# Patient Record
Sex: FEMALE | Race: WHITE | Employment: OTHER | URBAN - METROPOLITAN AREA
[De-identification: names, ages, dates, MRNs, and addresses within clinical notes are randomized per-mention and may not be internally consistent; named-entity substitution may affect disease eponyms.]

---

## 2017-10-20 ENCOUNTER — OFFICE VISIT (OUTPATIENT)
Dept: CARDIOLOGY CLINIC | Age: 67
End: 2017-10-20

## 2017-10-20 VITALS
WEIGHT: 168 LBS | HEART RATE: 76 BPM | HEIGHT: 63 IN | BODY MASS INDEX: 29.77 KG/M2 | SYSTOLIC BLOOD PRESSURE: 160 MMHG | OXYGEN SATURATION: 98 % | DIASTOLIC BLOOD PRESSURE: 100 MMHG

## 2017-10-20 DIAGNOSIS — I25.119 CORONARY ARTERY DISEASE INVOLVING NATIVE CORONARY ARTERY OF NATIVE HEART WITH ANGINA PECTORIS (HCC): Primary | ICD-10-CM

## 2017-10-20 PROBLEM — I25.10 CAD (CORONARY ARTERY DISEASE): Status: ACTIVE | Noted: 2017-10-20

## 2017-10-20 RX ORDER — TOLTERODINE 4 MG/1
4 CAPSULE, EXTENDED RELEASE ORAL DAILY
Status: ON HOLD | COMMUNITY
End: 2021-06-22 | Stop reason: CLARIF

## 2017-10-20 RX ORDER — PRAVASTATIN SODIUM 40 MG/1
40 TABLET ORAL
COMMUNITY

## 2017-10-20 RX ORDER — SUCRALFATE 1 G/1
1 TABLET ORAL 4 TIMES DAILY
Status: ON HOLD | COMMUNITY
End: 2021-06-22 | Stop reason: CLARIF

## 2017-10-20 RX ORDER — CARVEDILOL 6.25 MG/1
3.12 TABLET ORAL 2 TIMES DAILY WITH MEALS
COMMUNITY
End: 2022-03-14 | Stop reason: SDUPTHER

## 2017-10-20 RX ORDER — ASPIRIN 325 MG
325 TABLET ORAL DAILY
COMMUNITY

## 2017-10-20 RX ORDER — CHOLECALCIFEROL (VITAMIN D3) 125 MCG
CAPSULE ORAL DAILY
COMMUNITY

## 2017-10-20 RX ORDER — VITAMIN E 268 MG
CAPSULE ORAL DAILY
COMMUNITY

## 2017-10-20 RX ORDER — OMEPRAZOLE 40 MG/1
40 CAPSULE, DELAYED RELEASE ORAL DAILY
COMMUNITY

## 2017-10-20 NOTE — PROGRESS NOTES
Zach Schmitz MD    [id number] [place], [address] [facility]    Office [phone],AFB [phone]  Pager [phone]    Fadumo Falk is a 69 y.o. female referred to establish care with cardiology. Consult requested by Cristy Lord MD      Primary care physician:  Cristy Lord MD      Chief complaint:  Fadumo Falk is a 69 y.o. female who complains of No chief complaint on file. Dear Dr. Samaria Chan,    I had the pleasure of seeing Ms Carlos Garduno in the office today. Assessment:  CAD -nonobstructive disease by cardiac cath at [facility] 5/2017. Patient does not want to have another stress nuclear study - \"ever\". She has had it twice once in the 1990s and again in 2017 and had\" bad reaction which almost caused a heart attack\" both times. HTN- not controlled  Hx of CVA  Anxiety      Plan:     Patient states that she is allergic to multiple medications. She is allergic to Cozaar/amlodipine. With regard to the amlodipine it is more of intolerance because of swelling in the lower extremities. She is not sure what the cause of her allergy is. She understands that if her blood pressure runs high it can affect multiple organs. She is already had a CVA. She states that she will try taking 1-1/2 tablets of the Coreg 6.25 twice daily. Will get records from Dr. Summer Harmon office. Aggressive cardiovascular risk factor modification. Follow-up in 6 months or earlier as needed. 11/2016 - ; ; HDL 46; ;       Patient understands the plan. All questions were answered to the patient's satisfaction. Medication Side Effects and Warnings were discussed with patient: yes  Patient Labs were reviewed and or requested:  yes  Patient Past Records were reviewed and or requested: yes    I appreciate the opportunity to be involved in Ms. Schmidt. Please see note below for details. Please do not hesitate to contact us with questions or concerns.     Zach Schmitz MD    Cardiac Testing/ Procedures: A. Cardiac Cath/PCI: 5/23/17-N STEMI-CJ W; left main-normal, LAD-mid 30-40%, left [id number]%, RCA-medium vessel small PDA; nyyvbaha68%    B. ECHO/BRITTA:    C.StressNuclear/Stress ECHO/Stress test:    D.Vascular:    E. EP:    F. Miscellaneous:    History of present illness:  Patient is a 61-year-old  female (her  is a patient here too ) who is here to establish care with cardiology. Denies any chest pain, dyspnea. Occasional mild swelling lower extremities. No dizziness, syncope. She states that she is allergic to some blood pressure medications. She has had difficulty controlling her blood pressure. She also states that she is very anxious and because of the anxiety her blood pressure goes up. Past Medical History:   Diagnosis Date    Arthritis     Asthma     CAD (coronary artery disease)     MI 5/2017    Diverticula of colon     Gastrointestinal disorder     Hypertension     Neurological disorder     CVA     Stroke [facility])       Past Surgical History:   Procedure Laterality Date    HX APPENDECTOMY      HX CHOLECYSTECTOMY      HX GYN      hysterectomy    HX ORTHOPAEDIC      left knee replacement     Family History   Problem Relation Age of Onset    Stroke Mother     Coronary Artery Disease Father       Social History   Substance Use Topics    Smoking status: Former Smoker    Smokeless tobacco: Never Used    Alcohol use No          Medications before admission:    Current Outpatient Prescriptions   Medication Sig Dispense    carvedilol (COREG) 6.25 mg tablet Take  by mouth two (2) times daily (with meals).  tolterodine ER (DETROL LA) 4 mg ER capsule Take 4 mg by mouth daily.  omeprazole (PRILOSEC) 40 mg capsule Take 40 mg by mouth two (2) times a day.  pravastatin (PRAVACHOL) 40 mg tablet Take 40 mg by mouth nightly.  aspirin (ASPIRIN) 325 mg tablet Take 325 mg by mouth daily.      sucralfate (CARAFATE) 1 gram tablet Take 1 g by mouth four (4) times daily.  vitamin E (AQUA GEMS) 400 unit capsule Take  by mouth daily.  cholecalciferol, vitamin D3, (VITAMIN D3) 2,000 unit tab Take  by mouth. No current facility-administered medications for this visit. Review of Systems:  (bold if positive, if negative)    Gen:  Eyes:   Need glassesENT:  CVS:  Pulm:  GI:  Heart burn  :    MS:  Pain, arthritisSkin:  Psych:  anxietyEndo:    Hem:  Renal:    Neuro:headache        Physical Exam:  Visit Vitals    BP (!) 160/100 (BP 1 Location: Right arm, BP Patient Position: Sitting)    Pulse 76    Ht 5' 3\" (1.6 m)    Wt 168 lb (76.2 kg)    SpO2 98%    BMI 29.76 kg/m2          Gen: Well-developed, well-nourished, in no acute distress  HEENT:  Pink conjunctivae, hearing intact to voice, moist mucous membranes  Neck: Supple,No JVD, No Carotid Bruit, Thyroid- non tender  Resp: No accessory muscle use, Clear breath sounds, No rales or rhonchi  Card: Regular Rate,Rythm,Normal S1, S2, No murmurs, rubs or gallop. No thrills. Abd:  Soft, non-tender, non-distended, normoactive bowel sounds are present,   MSK: No cyanosis or clubbing  Skin: No rashes or ulcers  Neuro:  moving all four extremities, no focal deficit, follows commands appropriately  Psych:  Good insight, oriented to person, place and time, alert, Nml Affect  LE: No edema  Vascular: Radial Pulses 2+ and symmetric        EKG: SR/Nml axis/ Nml intervals      Cxray:    LABS:    No results for input(s): CPK, TROIQ in the last 72 hours.     No lab exists for component: CKQMB, CPKMB    Lab Results   Component Value Date/Time    WBC 5.3 11/11/2015 11:30 PM    HGB 12.7 11/11/2015 11:30 PM    HCT 38.7 11/11/2015 11:30 PM    PLATELET 695 75/19/6158 11:30 PM    MCV 94.2 11/11/2015 11:30 PM     Lab Results   Component Value Date/Time    Sodium 141 11/11/2015 11:30 PM    Potassium 3.9 11/11/2015 11:30 PM    Chloride 106 11/11/2015 11:30 PM    CO2 29 11/11/2015 11:30 PM    Anion gap 6 11/11/2015 11:30 PM    Glucose 104 11/11/2015 11:30 PM    BUN 16 11/11/2015 11:30 PM    Creatinine 0.87 11/11/2015 11:30 PM    BUN/Creatinine ratio 18 11/11/2015 11:30 PM    GFR est AA >60 11/11/2015 11:30 PM    GFR est non-AA >60 11/11/2015 11:30 PM    Calcium 8.8 11/11/2015 11:30 PM             Giovani Hitchcock MD

## 2017-10-20 NOTE — MR AVS SNAPSHOT
Visit Information Date & Time Provider Department Dept. Phone Encounter #  
 10/20/2017 11:00 AM Los Cardona MD [facility] [place] [place] [phone] [id number] Upcoming Health Maintenance Date Due Hepatitis C Screening 1950 DTaP/Tdap/Td series (1 - Tdap) 5/14/1969 BREAST CANCER SCRN MAMMOGRAM 5/14/1998 FOBT Q 1 YEAR AGE 50-75 5/14/1998 ZOSTER VACCINE AGE 60> 3/14/2008 GLAUCOMA SCREENING Q2Y 5/14/2013 OSTEOPOROSIS SCREENING (DEXA) 5/14/2013 Pneumococcal 65+ Low/Medium Risk (1 of 2 - PCV13) 5/14/2013 MEDICARE YEARLY EXAM 5/14/2013 INFLUENZA AGE 9 TO ADULT 8/1/2017 Allergies as of 10/20/2017  In Progress On: 11/11/2015 By: Khushboo Hand RN Severity Noted Reaction Type Reactions Latex Medium 11/11/2015    Hives Morphine High 11/11/2015    Anaphylaxis Pcn [Penicillins]  11/11/2015    Unknown (comments) Current Immunizations  Never Reviewed No immunizations on file. Not reviewed this visit You Were Diagnosed With   
  
 Codes Comments Coronary artery disease involving native coronary artery of native heart with angina pectoris ([facility] [address]    -  Primary ICD-10-CM: I25.119 ICD-9-CM: 414.01, 413.9 Vitals BP Pulse Height(growth percentile) Weight(growth percentile) SpO2 BMI  
 (!) 160/100 (BP 1 Location: Right arm, BP Patient Position: Sitting) 76 5' 3\" (1.6 m) 168 lb (76.2 kg) 98% 29.76 kg/m2 OB Status Smoking Status Postpartum Former Smoker Vitals History BMI and BSA Data Body Mass Index Body Surface Area  
 29.76 kg/m 2 1.84 m 2 Your Updated Medication List  
  
   
This list is accurate as of: 10/20/17 11:45 AM.  Always use your most recent med list.  
  
  
  
  
 aspirin 325 mg tablet Commonly known as:  ASPIRIN Take 325 mg by mouth daily. carvedilol 6.25 mg tablet Commonly known as:  Macel Da Take  by mouth two (2) times daily (with meals). omeprazole 40 mg capsule Commonly known as:  PRILOSEC Take 40 mg by mouth two (2) times a day. pravastatin 40 mg tablet Commonly known as:  PRAVACHOL Take 40 mg by mouth nightly. sucralfate 1 gram tablet Commonly known as:  Charm Chencho Take 1 g by mouth four (4) times daily. tolterodine ER 4 mg ER capsule Commonly known as:  Edwardo Nails Take 4 mg by mouth daily. VITAMIN D3 2,000 unit Tab Generic drug:  cholecalciferol (vitamin D3) Take  by mouth.  
  
 vitamin E 400 unit capsule Commonly known as:  [address] Take  by mouth daily. We Performed the Following AMB POC EKG ROUTINE W/ 12 LEADS, INTER & REP [70000 CPT(R)] Introducing \[facility]\"" & [facility] SERVICES! [facility] introduces [employer] patient portal. Now you can access parts of your medical record, email your doctor's office, and request medication refills online. 1. In your internet browser, go to [email or web address] 2. Click on the First Time User? Click Here link in the Sign In box. You will see the New Member Sign Up page. 3. Enter your [employer] Access Code exactly as it appears below. You will not need to use this code after youve completed the sign-up process. If you do not sign up before the expiration date, you must request a new code. · [employer] Access Code: [email or web address] Expires: 1/18/2018 11:10 AM 
 
4. Enter the last four digits of your Social Security Number (xxxx) and Date of Birth (mm/dd/yyyy) as indicated and click Submit. You will be taken to the next sign-up page. 5. Create a [employer] Wanted ID. This will be your [employer] login ID and cannot be changed, so think of one that is secure and easy to remember. 6. Create a [employer] password. You can change your password at any time. 7. Enter your Password Reset Question and Answer. This can be used at a later time if you forget your password. 8. Enter your e-mail address.  You will receive e-mail notification when new information is available in [employer]. 9. Click Sign Up. You can now view and download portions of your medical record. 10. Click the Download Summary menu link to download a portable copy of your medical information. If you have questions, please visit the Frequently Asked Questions section of the [employer] website. Remember, [employer] is NOT to be used for urgent needs. For medical emergencies, dial 911. Now available from your iPhone and Android! Please provide this summary of care documentation to your next provider. Your primary care clinician is listed as Carina Shafer. If you have any questions after today's visit, please call [phone].

## 2020-03-21 ENCOUNTER — HOSPITAL ENCOUNTER (OUTPATIENT)
Dept: GENERAL RADIOLOGY | Age: 70
Discharge: HOME | End: 2020-03-21
Payer: MEDICARE

## 2020-03-21 DIAGNOSIS — J18.9 PNEUMONIA OF RIGHT LOWER LOBE DUE TO INFECTIOUS ORGANISM: ICD-10-CM

## 2020-03-21 DIAGNOSIS — J18.9 UNRESOLVED PNEUMONIA: ICD-10-CM

## 2020-03-21 PROCEDURE — 71046 X-RAY EXAM CHEST 2 VIEWS: CPT

## 2020-06-30 ENCOUNTER — HOSPITAL ENCOUNTER (OUTPATIENT)
Dept: CT IMAGING | Age: 70
Discharge: HOME | End: 2020-06-30
Attending: ORTHOPAEDIC SURGERY
Payer: MEDICARE

## 2020-06-30 DIAGNOSIS — Z96.652 STATUS POST TOTAL KNEE REPLACEMENT, LEFT: ICD-10-CM

## 2020-06-30 PROCEDURE — 73700 CT LOWER EXTREMITY W/O DYE: CPT

## 2020-10-29 LAB — SARS-COV-2, NAA: NOT DETECTED

## 2021-03-10 ENCOUNTER — OFFICE VISIT (OUTPATIENT)
Dept: CARDIOLOGY CLINIC | Age: 71
End: 2021-03-10
Payer: MEDICARE

## 2021-03-10 VITALS
DIASTOLIC BLOOD PRESSURE: 70 MMHG | WEIGHT: 167 LBS | HEIGHT: 63 IN | HEART RATE: 80 BPM | OXYGEN SATURATION: 98 % | SYSTOLIC BLOOD PRESSURE: 118 MMHG | BODY MASS INDEX: 29.59 KG/M2

## 2021-03-10 DIAGNOSIS — R07.9 CHEST PAIN, UNSPECIFIED TYPE: Primary | ICD-10-CM

## 2021-03-10 DIAGNOSIS — R06.00 DYSPNEA, UNSPECIFIED TYPE: ICD-10-CM

## 2021-03-10 DIAGNOSIS — E78.5 DYSLIPIDEMIA: ICD-10-CM

## 2021-03-10 DIAGNOSIS — I25.119 CORONARY ARTERY DISEASE INVOLVING NATIVE CORONARY ARTERY OF NATIVE HEART WITH ANGINA PECTORIS (HCC): ICD-10-CM

## 2021-03-10 DIAGNOSIS — R42 DIZZINESS: ICD-10-CM

## 2021-03-10 DIAGNOSIS — I10 ESSENTIAL HYPERTENSION: ICD-10-CM

## 2021-03-10 PROCEDURE — G8754 DIAS BP LESS 90: HCPCS | Performed by: INTERNAL MEDICINE

## 2021-03-10 PROCEDURE — G8427 DOCREV CUR MEDS BY ELIG CLIN: HCPCS | Performed by: INTERNAL MEDICINE

## 2021-03-10 PROCEDURE — G8536 NO DOC ELDER MAL SCRN: HCPCS | Performed by: INTERNAL MEDICINE

## 2021-03-10 PROCEDURE — G8400 PT W/DXA NO RESULTS DOC: HCPCS | Performed by: INTERNAL MEDICINE

## 2021-03-10 PROCEDURE — 1101F PT FALLS ASSESS-DOCD LE1/YR: CPT | Performed by: INTERNAL MEDICINE

## 2021-03-10 PROCEDURE — 3017F COLORECTAL CA SCREEN DOC REV: CPT | Performed by: INTERNAL MEDICINE

## 2021-03-10 PROCEDURE — 1090F PRES/ABSN URINE INCON ASSESS: CPT | Performed by: INTERNAL MEDICINE

## 2021-03-10 PROCEDURE — G8419 CALC BMI OUT NRM PARAM NOF/U: HCPCS | Performed by: INTERNAL MEDICINE

## 2021-03-10 PROCEDURE — 93010 ELECTROCARDIOGRAM REPORT: CPT | Performed by: INTERNAL MEDICINE

## 2021-03-10 PROCEDURE — G8752 SYS BP LESS 140: HCPCS | Performed by: INTERNAL MEDICINE

## 2021-03-10 PROCEDURE — G0463 HOSPITAL OUTPT CLINIC VISIT: HCPCS | Performed by: INTERNAL MEDICINE

## 2021-03-10 PROCEDURE — 93005 ELECTROCARDIOGRAM TRACING: CPT | Performed by: INTERNAL MEDICINE

## 2021-03-10 PROCEDURE — G8510 SCR DEP NEG, NO PLAN REQD: HCPCS | Performed by: INTERNAL MEDICINE

## 2021-03-10 PROCEDURE — 99214 OFFICE O/P EST MOD 30 MIN: CPT | Performed by: INTERNAL MEDICINE

## 2021-03-10 NOTE — PROGRESS NOTES
Danielle Tapia is a 65 y.o. female    Chief Complaint   Patient presents with    New Patient    Coronary Artery Disease    Dizziness       Chest pain patient states some Chest pain 2 months ago    SOB patient states SOB; COVID 4 months ago    Dizziness patient states some dizziness; vertigo; will be following up neurologist     Swelling patient states some swelling in feet possibly from knee replacement     Refills No    Visit Vitals  /72 (BP 1 Location: Left upper arm, BP Patient Position: Sitting)   Pulse 74   Ht 5' 3\" (1.6 m)   Wt 167 lb (75.8 kg)   SpO2 95%   BMI 29.58 kg/m²     Vitals:    03/10/21 1325 03/10/21 1355 03/10/21 1357   BP: 114/72 122/70 118/70   BP 1 Location: Left upper arm Left upper arm Left upper arm   BP Patient Position: Sitting Supine Standing   Pulse: 74 70 80   SpO2: 95% 94% 98%   Weight: 167 lb (75.8 kg)     Height: 5' 3\" (1.6 m)           1. Have you been to the ER, urgent care clinic since your last visit? Hospitalized since your last visit? ED JW  3/2021    2. Have you seen or consulted any other health care providers outside of the [address] [place] [address] since your last visit? Include any pap smears or colon screening.   No

## 2021-03-10 NOTE — PROGRESS NOTES
Nellie Bautista MD., [facility]    [address] [place], [address] [facility]    Office [phone],[facility] [phone]           Shaye Maier is a 65 y.o. female referred for evaluation of chest pain. Consult requested by Charley Avalos MD    CC - as documented in EMR    Dear Dr. Charley Avalos MD    I had the pleasure of seeing Ms. Shaye Maier in the office today. Assessment:   Chest pain/  CAD -nonobstructive disease by cardiac cath at [facility] 5/2017. Patient does not want to have another stress nuclear study - \"ever\". She has had it twice once in the 1990s and again in 2017 and had\" bad reaction which almost caused a heart attack\" both times. Hypertension  Hyperlipidemia  History of CVA  Dizziness-not orthostatic in the office today. She has been seen by ENT physician and has been referred to neurology. Anxiety    Plan:    Echocardiogram  Pt does not want to do any kind of stress test because of her prior experience with it. Other option is to do cardiac catheterization. However she is reluctant to do invasive procedures because her chest pain is resolved. If it recurs again, she will let me know and we will proceed with cardiac catheterization. Target LDL less than 70. Continue statin. Blood pressure controlled-not orthostatic. Continue Coreg. Aggressive cardiovascular risk factor modification. Has had blood work by PCP approximately 2 weeks ago and was told that it was normal.  Follow-up in 3 months/earlier as needed    Patient understands the plan. All questions were answered to the patient's satisfaction. I appreciate the opportunity to be involved in Ms. Schmidt. See note below for details. Please do not hesitate to contact us   with questions or concerns. Nellie Bautista MD      Cardiac Testing/ Procedures: A. Cardiac Cath/PCI:  5/23/17-N STEMI-CJ W; left main-normal, LAD-mid 30-40%, left [id number]%, RCA-medium vessel small PDA; prcbqkfv45%    B. ECHO/BRITTA:    C.StressNuclear/Stress ECHO/Stress test:    D.Vascular:    E. EP:    F. Miscellaneous:    History:     Jo Bernheim is a 65 y.o. female who is referred for evaluation of chest pain. Patient states that she has been having left-sided chest pain which was constant. She believes that it could be from picking up her grandchild. She went to [facility] about a week and half ago and was told that it could be musculoskeletal.  Since then the pain has resolved. No shortness of breath, swelling lower extremities. Occasionally feels palpitations but it resolves quickly. No  Syncope. Hx of dizziness - chronic. Occ has dizziness from sitting to standing position          Past Medical/Surgical and Family/Social History:     Past Medical History:   Diagnosis Date    Arthritis     Asthma     CAD (coronary artery disease)     MI 5/2017    Diverticula of colon     Gastrointestinal disorder     Hypertension     Neurological disorder     CVA     Stroke [facility])       Past Surgical History:   Procedure Laterality Date    HX APPENDECTOMY      HX CHOLECYSTECTOMY      HX GYN      hysterectomy    HX ORTHOPAEDIC      left knee replacement     Family History   Problem Relation Age of Onset    Stroke Mother     Coronary Artery Disease Father       Social History     Tobacco Use    Smoking status: Former Smoker    Smokeless tobacco: Never Used   Substance Use Topics    Alcohol use: No    Drug use: Not on file            Medications:       Current Outpatient Medications   Medication Sig Dispense    carvedilol (COREG) 6.25 mg tablet Take  by mouth two (2) times daily (with meals). 1/2 tablet twice a day     omeprazole (PRILOSEC) 40 mg capsule Take 40 mg by mouth two (2) times a day.  pravastatin (PRAVACHOL) 40 mg tablet Take 40 mg by mouth nightly. 1/2 tablet     aspirin (ASPIRIN) 325 mg tablet Take 325 mg by mouth daily.      vitamin E (AQUA GEMS) 400 unit capsule Take by mouth daily.  cholecalciferol, vitamin D3, (VITAMIN D3) 2,000 unit tab Take  by mouth.  tolterodine ER (DETROL LA) 4 mg ER capsule Take 4 mg by mouth daily.  sucralfate (CARAFATE) 1 gram tablet Take 1 g by mouth four (4) times daily. No current facility-administered medications for this visit. Review of Systems:     (bold if positive, if negative)    Gen:  Eyes:  ENT:  CVS:  Pulm:  GI:  nausea,GERDGU:  MS:  Pain, arthritisSkin:  Psych:  anxietyEndo:  Hem:  Renal:  Neuro:  Physical Exam:     Visit Vitals  /70 (BP 1 Location: Left upper arm, BP Patient Position: Standing)   Pulse 80   Ht 5' 3\" (1.6 m)   Wt 167 lb (75.8 kg)   SpO2 98%   BMI 29.58 kg/m²          Gen: Well-developed, well-nourished, in no acute distress  Neck: Supple,No JVD, No Carotid Bruit,   Resp: No accessory muscle use, Clear breath sounds, No rales or rhonchi  Card: Regular Rate,Rythm,Normal S1, S2, No murmurs, rubs or gallop. No thrills. Abd:  Soft, non-tender, non-distended,BS+,   MSK: No cyanosis  Skin: No rashes    Neuro: moving all four extremities , follows commands appropriately  Psych:  Good insight, oriented to person, place , alert, Nml Affect  LE: No edema    EKG: Sinus rhythm, normal axis, nonspecific ST changes,      Medication Side Effects and Warnings were discussed with patient: yes  Patient Labs were reviewed and/or requested:  yes  Patient Past Records were reviewed and/or requested: yes    Total time:       mins    ATTENTION:   This medical record was transcribed using an electronic medical records/speech recognition system. Although proofread, it may and can contain electronic, spelling and other errors. Corrections may be executed at a later time. Please feel free to contact us for any clarifications as needed.       Darlene Guthrie MD

## 2021-03-24 ENCOUNTER — ANCILLARY PROCEDURE (OUTPATIENT)
Dept: CARDIOLOGY CLINIC | Age: 71
End: 2021-03-24
Payer: MEDICARE

## 2021-03-24 VITALS
BODY MASS INDEX: 29.59 KG/M2 | WEIGHT: 167 LBS | SYSTOLIC BLOOD PRESSURE: 120 MMHG | DIASTOLIC BLOOD PRESSURE: 70 MMHG | HEIGHT: 63 IN

## 2021-03-24 DIAGNOSIS — R06.00 DYSPNEA, UNSPECIFIED TYPE: ICD-10-CM

## 2021-03-24 DIAGNOSIS — R42 DIZZINESS: ICD-10-CM

## 2021-03-24 DIAGNOSIS — R07.9 CHEST PAIN, UNSPECIFIED TYPE: ICD-10-CM

## 2021-03-24 DIAGNOSIS — I25.119 CORONARY ARTERY DISEASE INVOLVING NATIVE CORONARY ARTERY OF NATIVE HEART WITH ANGINA PECTORIS (HCC): ICD-10-CM

## 2021-03-24 PROCEDURE — 93306 TTE W/DOPPLER COMPLETE: CPT | Performed by: INTERNAL MEDICINE

## 2021-03-29 ENCOUNTER — TRANSCRIBE ORDER (OUTPATIENT)
Dept: SCHEDULING | Age: 71
End: 2021-03-29

## 2021-03-29 DIAGNOSIS — H90.3 SENSORY HEARING LOSS, BILATERAL: Primary | ICD-10-CM

## 2021-03-29 DIAGNOSIS — H83.2X3: ICD-10-CM

## 2021-03-29 DIAGNOSIS — H81.11 BENIGN PAROXYSMAL VERTIGO OF RIGHT EAR: ICD-10-CM

## 2021-03-29 LAB
ECHO AO ROOT DIAM: 2.92 CM
ECHO AV AREA PEAK VELOCITY: 2.31 CM2
ECHO AV AREA VTI: 2.33 CM2
ECHO AV AREA/BSA PEAK VELOCITY: 1.3 CM2/M2
ECHO AV AREA/BSA VTI: 1.3 CM2/M2
ECHO AV MEAN GRADIENT: 2.86 MMHG
ECHO AV PEAK GRADIENT: 5.22 MMHG
ECHO AV PEAK VELOCITY: 114.27 CM/S
ECHO AV VTI: 24.28 CM
ECHO LA AREA 4C: 12.52 CM2
ECHO LA MAJOR AXIS: 4.11 CM
ECHO LA MINOR AXIS: 2.29 CM
ECHO LA VOL 2C: 40.4 ML (ref 22–52)
ECHO LA VOL 4C: 27.46 ML (ref 22–52)
ECHO LA VOL BP: 35.76 ML (ref 22–52)
ECHO LA VOL/BSA BIPLANE: 19.96 ML/M2 (ref 16–28)
ECHO LA VOLUME INDEX A2C: 22.56 ML/M2 (ref 16–28)
ECHO LA VOLUME INDEX A4C: 15.33 ML/M2 (ref 16–28)
ECHO LV E' LATERAL VELOCITY: 7.28 CM/S
ECHO LV E' SEPTAL VELOCITY: 4.78 CM/S
ECHO LV EDV A2C: 62.63 ML
ECHO LV EDV A4C: 74.63 ML
ECHO LV EDV BP: 71.24 ML (ref 56–104)
ECHO LV EDV INDEX A4C: 41.7 ML/M2
ECHO LV EDV INDEX BP: 39.8 ML/M2
ECHO LV EDV NDEX A2C: 35 ML/M2
ECHO LV EJECTION FRACTION A2C: 47 PERCENT
ECHO LV EJECTION FRACTION A4C: 64 PERCENT
ECHO LV EJECTION FRACTION BIPLANE: 57.5 PERCENT (ref 55–100)
ECHO LV ESV A2C: 33.1 ML
ECHO LV ESV A4C: 26.85 ML
ECHO LV ESV BP: 30.26 ML (ref 19–49)
ECHO LV ESV INDEX A2C: 18.5 ML/M2
ECHO LV ESV INDEX A4C: 15 ML/M2
ECHO LV ESV INDEX BP: 16.9 ML/M2
ECHO LV INTERNAL DIMENSION DIASTOLIC: 4.58 CM (ref 3.9–5.3)
ECHO LV INTERNAL DIMENSION SYSTOLIC: 3.07 CM
ECHO LV IVSD: 0.99 CM (ref 0.6–0.9)
ECHO LV MASS 2D: 156.6 G (ref 67–162)
ECHO LV MASS INDEX 2D: 87.4 G/M2 (ref 43–95)
ECHO LV POSTERIOR WALL DIASTOLIC: 1 CM (ref 0.6–0.9)
ECHO LVOT DIAM: 2 CM
ECHO LVOT PEAK GRADIENT: 2.82 MMHG
ECHO LVOT PEAK VELOCITY: 83.91 CM/S
ECHO LVOT SV: 56.5 ML
ECHO LVOT VTI: 17.94 CM
ECHO MV A VELOCITY: 107.11 CM/S
ECHO MV AREA PHT: 3.4 CM2
ECHO MV E DECELERATION TIME (DT): 222.88 MS
ECHO MV E VELOCITY: 59.2 CM/S
ECHO MV E/A RATIO: 0.55
ECHO MV E/E' LATERAL: 8.13
ECHO MV E/E' RATIO (AVERAGED): 10.26
ECHO MV E/E' SEPTAL: 12.38
ECHO MV PRESSURE HALF TIME (PHT): 64.63 MS
ECHO RA AREA 4C: 11.15 CM2
ECHO RV INTERNAL DIMENSION: 3.27 CM
ECHO RV TAPSE: 1.87 CM (ref 1.5–2)
LA VOL DISK BP: 34.13 ML (ref 22–52)

## 2021-03-29 NOTE — PROGRESS NOTES
Verified patient with two patient identifiers. Called patient and informed her of  normal pump function test results per .

## 2021-04-07 ENCOUNTER — HOSPITAL ENCOUNTER (OUTPATIENT)
Dept: MRI IMAGING | Age: 71
Discharge: HOME | End: 2021-04-07
Attending: SPECIALIST
Payer: MEDICARE

## 2021-04-07 VITALS — WEIGHT: 167 LBS | BODY MASS INDEX: 29.58 KG/M2

## 2021-04-07 DIAGNOSIS — H83.2X3: ICD-10-CM

## 2021-04-07 DIAGNOSIS — H81.11 BENIGN PAROXYSMAL VERTIGO OF RIGHT EAR: ICD-10-CM

## 2021-04-07 DIAGNOSIS — H90.3 SENSORY HEARING LOSS, BILATERAL: ICD-10-CM

## 2021-04-07 PROCEDURE — 70553 MRI BRAIN STEM W/O & W/DYE: CPT

## 2021-04-07 PROCEDURE — 74011250636 HC RX REV CODE- 250/636: Performed by: SPECIALIST

## 2021-04-07 PROCEDURE — A9575 INJ GADOTERATE MEGLUMI 0.1ML: HCPCS | Performed by: SPECIALIST

## 2021-04-07 RX ORDER — GADOTERATE MEGLUMINE 376.9 MG/ML
15 INJECTION INTRAVENOUS
Status: COMPLETED | OUTPATIENT
Start: 2021-04-07 | End: 2021-04-07

## 2021-04-07 RX ADMIN — GADOTERATE MEGLUMINE 15 ML: 376.9 INJECTION INTRAVENOUS at 15:00

## 2021-06-11 ENCOUNTER — OFFICE VISIT (OUTPATIENT)
Dept: CARDIOLOGY CLINIC | Age: 71
End: 2021-06-11
Payer: MEDICARE

## 2021-06-11 VITALS
HEART RATE: 74 BPM | DIASTOLIC BLOOD PRESSURE: 80 MMHG | WEIGHT: 160.2 LBS | OXYGEN SATURATION: 96 % | HEIGHT: 63 IN | BODY MASS INDEX: 28.39 KG/M2 | SYSTOLIC BLOOD PRESSURE: 130 MMHG

## 2021-06-11 DIAGNOSIS — E78.5 DYSLIPIDEMIA: ICD-10-CM

## 2021-06-11 DIAGNOSIS — I25.10 CORONARY ARTERY DISEASE INVOLVING NATIVE CORONARY ARTERY OF NATIVE HEART WITHOUT ANGINA PECTORIS: Primary | ICD-10-CM

## 2021-06-11 DIAGNOSIS — I10 ESSENTIAL HYPERTENSION: ICD-10-CM

## 2021-06-11 PROCEDURE — G8754 DIAS BP LESS 90: HCPCS | Performed by: INTERNAL MEDICINE

## 2021-06-11 PROCEDURE — 99214 OFFICE O/P EST MOD 30 MIN: CPT | Performed by: INTERNAL MEDICINE

## 2021-06-11 PROCEDURE — G0463 HOSPITAL OUTPT CLINIC VISIT: HCPCS | Performed by: INTERNAL MEDICINE

## 2021-06-11 PROCEDURE — G8419 CALC BMI OUT NRM PARAM NOF/U: HCPCS | Performed by: INTERNAL MEDICINE

## 2021-06-11 PROCEDURE — G8427 DOCREV CUR MEDS BY ELIG CLIN: HCPCS | Performed by: INTERNAL MEDICINE

## 2021-06-11 PROCEDURE — 1101F PT FALLS ASSESS-DOCD LE1/YR: CPT | Performed by: INTERNAL MEDICINE

## 2021-06-11 PROCEDURE — G8400 PT W/DXA NO RESULTS DOC: HCPCS | Performed by: INTERNAL MEDICINE

## 2021-06-11 PROCEDURE — G8752 SYS BP LESS 140: HCPCS | Performed by: INTERNAL MEDICINE

## 2021-06-11 PROCEDURE — G8432 DEP SCR NOT DOC, RNG: HCPCS | Performed by: INTERNAL MEDICINE

## 2021-06-11 PROCEDURE — G8536 NO DOC ELDER MAL SCRN: HCPCS | Performed by: INTERNAL MEDICINE

## 2021-06-11 PROCEDURE — 1090F PRES/ABSN URINE INCON ASSESS: CPT | Performed by: INTERNAL MEDICINE

## 2021-06-11 PROCEDURE — 3017F COLORECTAL CA SCREEN DOC REV: CPT | Performed by: INTERNAL MEDICINE

## 2021-06-11 RX ORDER — ALPRAZOLAM 0.5 MG/1
TABLET ORAL
COMMUNITY
Start: 2021-04-13

## 2021-06-11 NOTE — LETTER
6/13/2021 Patient: Jack Nicole YOB: 1950 Date of Visit: 6/11/2021 Arianna Iglesias MD 
[phone] [address] Fax: [phone] Dear Arianna Iglesias MD, Thank you for referring Ms. Jack Nicole to [facility] for evaluation. My notes for this consultation are attached. If you have questions, please do not hesitate to call me. I look forward to following your patient along with you. Sincerely, Floridalma Mckeon MD

## 2021-06-11 NOTE — PROGRESS NOTES
Adrián Hoffmann MD., [facility]    [address] [place], [address] [facility]    Office [phone],EAA [phone]           Clara Belle is a 66 y.o. female - f/u visit  CC - as documented in EMR    Dear Dr. Corinna Francis MD    I had the pleasure of seeing Ms. Clara Belle in the office today. Assessment:   Chest pain- atypical  CAD -nonobstructive disease by cardiac cath at [facility] 5/2017. Patient does not want to have another stress nuclear study - \"ever\". She has had it twice once in the 1990s and again in 2017 and had\" bad reaction which almost caused a heart attack\" both times. Hypertension  Hyperlipidemia  History of CVA  Dizziness-not orthostatic in the office today. She has been seen by ENT physician and has been referred to neurology. Anxiety    Plan:    Echocardiogram 3/2021 - Nml EF  Does not want nuclear - will try stress echo. Hold Coreg on day of the test  (Pt does not want to do any kind of stress test because of her prior experience with it.)    Target LDL less than 70. Continue statin. Blood pressure controlled  Continue Coreg. Aggressive cardiovascular risk factor modification. Follow-up in 6 months/earlier as needed    Patient understands the plan. All questions were answered to the patient's satisfaction. I appreciate the opportunity to be involved in Ms. Schmidt. See note below for details. Please do not hesitate to contact us   with questions or concerns. Adrián Hoffmann MD      Cardiac Testing/ Procedures: A. Cardiac Cath/PCI:  5/23/17-N STEMI-CJ W; left main-normal, LAD-mid 30-40%, left [id number]%, RCA-medium vessel small PDA; vhrhxarh43%    B. ECHO/BRITTA: 3/24/21 EF 55-60%; Grade 1 DD    C. StressNuclear/Stress ECHO/Stress test:    D.Vascular:    E. EP:    F. Miscellaneous:    History:     Clara Belle is a 66 y.o. female who is referred for evaluation of chest pain. .Still has intermittent chest 'ache'.  No dyspnea  No Syncope. No swelling LE2    Hx of dizziness - chronic. Hx of hiatal hernia - thinking of getting surgery          Past Medical/Surgical and Family/Social History:     Past Medical History:   Diagnosis Date    Arthritis     Asthma     CAD (coronary artery disease)     MI 5/2017    Diverticula of colon     Gastrointestinal disorder     Hypertension     Neurological disorder     CVA     Stroke [facility])       Past Surgical History:   Procedure Laterality Date    HX APPENDECTOMY      HX CHOLECYSTECTOMY      HX GYN      hysterectomy    HX ORTHOPAEDIC      left knee replacement     Family History   Problem Relation Age of Onset    Stroke Mother     Coronary Artery Disease Father       Social History     Tobacco Use    Smoking status: Former Smoker    Smokeless tobacco: Never Used   Substance Use Topics    Alcohol use: No    Drug use: Not on file            Medications:       Current Outpatient Medications   Medication Sig Dispense    ascorbic acid (VITAMIN C PO) Take  by mouth daily.  elderberry fruit (ELDERBERRY PO) Take  by mouth daily.  ALPRAZolam (XANAX) 0.5 mg tablet TAKE 1 TABLET BY MOUTH ONCE DAILY AS NEEDED     Multivitamins with Fluoride (MULTI-VITAMIN PO) Take  by mouth daily.  carvedilol (COREG) 6.25 mg tablet Take 3.125 mg by mouth two (2) times daily (with meals). 1/2 tablet twice a day     omeprazole (PRILOSEC) 40 mg capsule Take 40 mg by mouth two (2) times a day.  pravastatin (PRAVACHOL) 40 mg tablet Take 40 mg by mouth nightly. 1/2 tablet     aspirin (ASPIRIN) 325 mg tablet Take 325 mg by mouth daily.  vitamin E (AQUA GEMS) 400 unit capsule Take  by mouth daily.  cholecalciferol, vitamin D3, (VITAMIN D3) 2,000 unit tab Take  by mouth daily.  tolterodine ER (DETROL LA) 4 mg ER capsule Take 4 mg by mouth daily. (Patient not taking: Reported on 6/11/2021)     sucralfate (CARAFATE) 1 gram tablet Take 1 g by mouth four (4) times daily.  (Patient not taking: Reported on 6/11/2021)      No current facility-administered medications for this visit. Review of Systems:     (bold if positive, if negative)    As in HPI  Physical Exam:     Visit Vitals  /80 (BP 1 Location: Left upper arm, BP Patient Position: Sitting)   Pulse 74   Ht 5' 3\" (1.6 m)   Wt 160 lb 3.2 oz (72.7 kg)   SpO2 96%   BMI 28.38 kg/m²          Gen: Well-developed, well-nourished, in no acute distress  Neck: Supple,No JVD, No Carotid Bruit,   Resp: No accessory muscle use, Clear breath sounds, No rales or rhonchi  Card: Regular Rate,Rythm,Normal S1, S2, No murmurs, rubs or gallop. No thrills. Abd:  Soft, non-tender, non-distended,BS+,   MSK: No cyanosis  Skin: No rashes    Neuro: moving all four extremities , follows commands appropriately  Psych:  Good insight, oriented to person, place , alert, Nml Affect  LE: No edema    EKG:       Medication Side Effects and Warnings were discussed with patient: yes  Patient Labs were reviewed and/or requested:  yes  Patient Past Records were reviewed and/or requested: yes    Total time:       mins    ATTENTION:   This medical record was transcribed using an electronic medical records/speech recognition system. Although proofread, it may and can contain electronic, spelling and other errors. Corrections may be executed at a later time. Please feel free to contact us for any clarifications as needed.       Adrián Hoffmann MD

## 2021-06-11 NOTE — H&P (VIEW-ONLY)
Enedina Tierney MD., [facility] [address] [place], [address] [facility] Office [phone] Ana Chu is a 66 y.o. female - f/u visit CC - as documented in EMR Dear Dr. Rodriguez Arthur MD 
 
I had the pleasure of seeing Ms. Ana Chu in the office today. Assessment:  
Chest pain- atypical 
CAD -nonobstructive disease by cardiac cath at [facility] W 5/2017. Patient does not want to have another stress nuclear study - \"ever\". She has had it twice once in the 1990s and again in 2017 and had\" bad reaction which almost caused a heart attack\" both times. Hypertension Hyperlipidemia History of CVA Dizziness-not orthostatic in the office today. She has been seen by ENT physician and has been referred to neurology. Anxiety Plan:   
Echocardiogram 3/2021 - Nml EF Does not want nuclear - will try stress echo. Hold Coreg on day of the test 
(Pt does not want to do any kind of stress test because of her prior experience with it.) Target LDL less than 70. Continue statin. Blood pressure controlled  Continue Coreg. Aggressive cardiovascular risk factor modification. Follow-up in 6 months/earlier as needed Patient understands the plan. All questions were answered to the patient's satisfaction. I appreciate the opportunity to be involved in Ms. Schmidt. See note below for details. Please do not hesitate to contact us  
with questions or concerns. Enedina Tierney MD 
 
 
Cardiac Testing/ Procedures: A. Cardiac Cath/PCI:  5/23/17-N STEMI-CJ W; left main-normal, LAD-mid 30-40%, left ldlnaoyaldRA8-29-70%, RCA-medium vessel small PDA; ljuuibjz89% B. ECHO/BRITTA: 3/24/21 EF 55-60%; Grade 1 DD C. StressNuclear/Stress ECHO/Stress test: D.Vascular: 
 
E. EP: 
 
F. Miscellaneous: 
 
History:  
 
Ana Chu is a 66 y.o. female who is referred for evaluation of chest pain. .Still has intermittent chest 'ache'.  No dyspnea  No Syncope. No swelling LE2 Hx of dizziness - chronic. Hx of hiatal hernia - thinking of getting surgery Past Medical/Surgical and Family/Social History:  
 
Past Medical History:  
Diagnosis Date  Arthritis  Asthma  CAD (coronary artery disease) MI 5/2017  Diverticula of colon  Gastrointestinal disorder  Hypertension  Neurological disorder CVA  Stroke ([facility] [address] Past Surgical History:  
Procedure Laterality Date  HX APPENDECTOMY  HX CHOLECYSTECTOMY  HX GYN    
 hysterectomy  HX ORTHOPAEDIC    
 left knee replacement Family History Problem Relation Age of Onset  Stroke Mother  Coronary Artery Disease Father Social History Tobacco Use  Smoking status: Former Smoker  Smokeless tobacco: Never Used Substance Use Topics  Alcohol use: No  
 Drug use: Not on file Medications:  
 
 
Current Outpatient Medications Medication Sig Dispense  ascorbic acid (VITAMIN C PO) Take  by mouth daily.  elderberry fruit (ELDERBERRY PO) Take  by mouth daily.  ALPRAZolam (XANAX) 0.5 mg tablet TAKE 1 TABLET BY MOUTH ONCE DAILY AS NEEDED  Multivitamins with Fluoride (MULTI-VITAMIN PO) Take  by mouth daily.  carvedilol (COREG) 6.25 mg tablet Take 3.125 mg by mouth two (2) times daily (with meals). 1/2 tablet twice a day  omeprazole (PRILOSEC) 40 mg capsule Take 40 mg by mouth two (2) times a day.  pravastatin (PRAVACHOL) 40 mg tablet Take 40 mg by mouth nightly. 1/2 tablet  aspirin (ASPIRIN) 325 mg tablet Take 325 mg by mouth daily.  vitamin E (AQUA GEMS) 400 unit capsule Take  by mouth daily.  cholecalciferol, vitamin D3, (VITAMIN D3) 2,000 unit tab Take  by mouth daily.  tolterodine ER (DETROL LA) 4 mg ER capsule Take 4 mg by mouth daily. (Patient not taking: Reported on 6/11/2021)  sucralfate (CARAFATE) 1 gram tablet Take 1 g by mouth four (4) times daily.  (Patient not taking: Reported on 6/11/2021) No current facility-administered medications for this visit. Review of Systems:  
 
(bold if positive, if negative) As in HPI Physical Exam:  
 
Visit Vitals /80 (BP 1 Location: Left upper arm, BP Patient Position: Sitting) Pulse 74 Ht 5' 3\" (1.6 m) Wt 160 lb 3.2 oz (72.7 kg) SpO2 96% BMI 28.38 kg/m² Gen: Well-developed, well-nourished, in no acute distress Neck: Supple,No JVD, No Carotid Bruit, Resp: No accessory muscle use, Clear breath sounds, No rales or rhonchi 
Card: Regular Rate,Rythm,Normal S1, S2, No murmurs, rubs or gallop. No thrills. Abd:  Soft, non-tender, non-distended,BS+,  
MSK: No cyanosis Skin: No rashes Neuro: moving all four extremities , follows commands appropriately Psych:  Good insight, oriented to person, place , alert, Nml Affect LE: No edema EKG:  
 
 
Medication Side Effects and Warnings were discussed with patient: yes Patient Labs were reviewed and/or requested:  yes Patient Past Records were reviewed and/or requested: yes Total time:       mins ATTENTION:  
This medical record was transcribed using an electronic medical records/speech recognition system. Although proofread, it may and can contain electronic, spelling and other errors. Corrections may be executed at a later time. Please feel free to contact us for any clarifications as needed.  
 
 
Ilana Abreu MD

## 2021-06-11 NOTE — PROGRESS NOTES
Ranulfo Camacho is a 66 y.o. female    Chief Complaint   Patient presents with    Chest Pain    Coronary Artery Disease    Hypertension    Cholesterol Problem    Dizziness     Patient states she had COVID in september  Chest pain patient states some soreness     SOB patient states SOB due to asthma     Dizziness patient states she has vertigo    Swelling No    Refills No    Visit Vitals  /80 (BP 1 Location: Left upper arm, BP Patient Position: Sitting)   Pulse 74   Ht 5' 3\" (1.6 m)   Wt 160 lb 3.2 oz (72.7 kg)   SpO2 96%   BMI 28.38 kg/m²       1. Have you been to the ER, urgent care clinic since your last visit? Hospitalized since your last visit? no    2. Have you seen or consulted any other health care providers outside of the [address] [place] [address] since your last visit? Include any pap smears or colon screening.   no

## 2021-06-14 ENCOUNTER — TELEPHONE (OUTPATIENT)
Dept: CARDIOLOGY CLINIC | Age: 71
End: 2021-06-14

## 2021-06-14 NOTE — TELEPHONE ENCOUNTER
Patient cancelled echo that was scheduled for 6/18/21, she did not want to reschedule but would like to schedule another procedure in place of this, she was unable to tell me what procedure it was but stated she was too weak and would not be able to to the procedure that was cancelled, please advise      [phone]

## 2021-06-14 NOTE — TELEPHONE ENCOUNTER
Verified patient with two patient identifiers. Per patient she does not want to have a nuclear stress test or stress echo instead she would like to go ahead and have a cardiac cath.

## 2021-06-17 NOTE — TELEPHONE ENCOUNTER
Spoke with pt concerning [facility] procedure. Instructions given to pt per VO Dr Michael Briscoe. Patient is to remain NPO after midnight. Take medications as prescribed. Have someone available to drive pt to and from procedure; pack a bag in case an overnight stay is warranted. [facility] scheduled for 1:00 pm on 6/22/21. Patient advised to arrive 2 hrs prior to procedure for prep. Patient verbalized understanding. Opportunities for questions, clarifications, and concerns provided.

## 2021-06-21 ENCOUNTER — TELEPHONE (OUTPATIENT)
Dept: CARDIOLOGY CLINIC | Age: 71
End: 2021-06-21

## 2021-06-21 DIAGNOSIS — R07.9 CHEST PAIN, UNSPECIFIED TYPE: Primary | ICD-10-CM

## 2021-06-21 RX ORDER — SODIUM CHLORIDE 0.9 % (FLUSH) 0.9 %
5-40 SYRINGE (ML) INJECTION EVERY 8 HOURS
Status: CANCELLED | OUTPATIENT
Start: 2021-06-22

## 2021-06-21 RX ORDER — SODIUM CHLORIDE 9 MG/ML
75 INJECTION, SOLUTION INTRAVENOUS CONTINUOUS
Status: CANCELLED | OUTPATIENT
Start: 2021-06-22

## 2021-06-21 RX ORDER — SODIUM CHLORIDE 0.9 % (FLUSH) 0.9 %
5-40 SYRINGE (ML) INJECTION AS NEEDED
Status: CANCELLED | OUTPATIENT
Start: 2021-06-22

## 2021-06-21 NOTE — TELEPHONE ENCOUNTER
Returned patient's call advised Aspirin is okay to take tomorrow prior to her LHC. Patient verbalized understanding.

## 2021-06-21 NOTE — TELEPHONE ENCOUNTER
Patient states she is having a procedure tomorrow, she states that she did not take the aspirin. Inquiring whether she should take it.             RVGAS:[phone]

## 2021-06-22 ENCOUNTER — HOSPITAL ENCOUNTER (OUTPATIENT)
Age: 71
Setting detail: OUTPATIENT SURGERY
Discharge: HOME | End: 2021-06-22
Attending: INTERNAL MEDICINE | Admitting: INTERNAL MEDICINE
Payer: MEDICARE

## 2021-06-22 VITALS
HEIGHT: 63 IN | BODY MASS INDEX: 27.91 KG/M2 | TEMPERATURE: 99.3 F | OXYGEN SATURATION: 94 % | RESPIRATION RATE: 20 BRPM | HEART RATE: 79 BPM | DIASTOLIC BLOOD PRESSURE: 84 MMHG | SYSTOLIC BLOOD PRESSURE: 129 MMHG | WEIGHT: 157.5 LBS

## 2021-06-22 DIAGNOSIS — R07.9 CHEST PAIN, UNSPECIFIED TYPE: ICD-10-CM

## 2021-06-22 LAB
ANION GAP SERPL CALC-SCNC: 8 MMOL/L (ref 5–15)
BUN SERPL-MCNC: 11 MG/DL (ref 6–20)
BUN/CREAT SERPL: 16 (ref 12–20)
CALCIUM SERPL-MCNC: 9.1 MG/DL (ref 8.5–10.1)
CHLORIDE SERPL-SCNC: 108 MMOL/L (ref 97–108)
CO2 SERPL-SCNC: 26 MMOL/L (ref 21–32)
CREAT SERPL-MCNC: 0.68 MG/DL (ref 0.55–1.02)
ERYTHROCYTE [DISTWIDTH] IN BLOOD BY AUTOMATED COUNT: 13.1 % (ref 11.5–14.5)
GLUCOSE SERPL-MCNC: 84 MG/DL (ref 65–100)
HCT VFR BLD AUTO: 41 % (ref 35–47)
HGB BLD-MCNC: 13.3 G/DL (ref 11.5–16)
MCH RBC QN AUTO: 31.3 PG (ref 26–34)
MCHC RBC AUTO-ENTMCNC: 32.4 G/DL (ref 30–36.5)
MCV RBC AUTO: 96.5 FL (ref 80–99)
NRBC # BLD: 0 K/UL (ref 0–0.01)
NRBC BLD-RTO: 0 PER 100 WBC
PLATELET # BLD AUTO: 218 K/UL (ref 150–400)
PMV BLD AUTO: 11.2 FL (ref 8.9–12.9)
POTASSIUM SERPL-SCNC: 4.2 MMOL/L (ref 3.5–5.1)
RBC # BLD AUTO: 4.25 M/UL (ref 3.8–5.2)
SODIUM SERPL-SCNC: 142 MMOL/L (ref 136–145)
WBC # BLD AUTO: 7.5 K/UL (ref 3.6–11)

## 2021-06-22 PROCEDURE — 77030029065 HC DRSG HEMO QCLOT ZMED -B

## 2021-06-22 PROCEDURE — 93458 L HRT ARTERY/VENTRICLE ANGIO: CPT | Performed by: INTERNAL MEDICINE

## 2021-06-22 PROCEDURE — 36415 COLL VENOUS BLD VENIPUNCTURE: CPT

## 2021-06-22 PROCEDURE — 74011000250 HC RX REV CODE- 250: Performed by: INTERNAL MEDICINE

## 2021-06-22 PROCEDURE — 99152 MOD SED SAME PHYS/QHP 5/>YRS: CPT | Performed by: INTERNAL MEDICINE

## 2021-06-22 PROCEDURE — 74011250636 HC RX REV CODE- 250/636: Performed by: INTERNAL MEDICINE

## 2021-06-22 PROCEDURE — 80048 BASIC METABOLIC PNL TOTAL CA: CPT

## 2021-06-22 PROCEDURE — 77030004532 HC CATH ANGI DX IMP BSC -A: Performed by: INTERNAL MEDICINE

## 2021-06-22 PROCEDURE — C1769 GUIDE WIRE: HCPCS | Performed by: INTERNAL MEDICINE

## 2021-06-22 PROCEDURE — 77030008543 HC TBNG MON PRSS MRTM -A: Performed by: INTERNAL MEDICINE

## 2021-06-22 PROCEDURE — C1894 INTRO/SHEATH, NON-LASER: HCPCS | Performed by: INTERNAL MEDICINE

## 2021-06-22 PROCEDURE — 77030019569 HC BND COMPR RAD TERU -B: Performed by: INTERNAL MEDICINE

## 2021-06-22 PROCEDURE — 99153 MOD SED SAME PHYS/QHP EA: CPT | Performed by: INTERNAL MEDICINE

## 2021-06-22 PROCEDURE — 85027 COMPLETE CBC AUTOMATED: CPT

## 2021-06-22 PROCEDURE — 74011000636 HC RX REV CODE- 636: Performed by: INTERNAL MEDICINE

## 2021-06-22 RX ORDER — VERAPAMIL HYDROCHLORIDE 2.5 MG/ML
INJECTION, SOLUTION INTRAVENOUS AS NEEDED
Status: DISCONTINUED | OUTPATIENT
Start: 2021-06-22 | End: 2021-06-22 | Stop reason: HOSPADM

## 2021-06-22 RX ORDER — SODIUM CHLORIDE 0.9 % (FLUSH) 0.9 %
5-40 SYRINGE (ML) INJECTION EVERY 8 HOURS
Status: DISCONTINUED | OUTPATIENT
Start: 2021-06-22 | End: 2021-06-22 | Stop reason: HOSPADM

## 2021-06-22 RX ORDER — SODIUM CHLORIDE 0.9 % (FLUSH) 0.9 %
5-40 SYRINGE (ML) INJECTION AS NEEDED
Status: DISCONTINUED | OUTPATIENT
Start: 2021-06-22 | End: 2021-06-22 | Stop reason: HOSPADM

## 2021-06-22 RX ORDER — DIPHENHYDRAMINE HYDROCHLORIDE 50 MG/ML
INJECTION, SOLUTION INTRAMUSCULAR; INTRAVENOUS AS NEEDED
Status: DISCONTINUED | OUTPATIENT
Start: 2021-06-22 | End: 2021-06-22 | Stop reason: HOSPADM

## 2021-06-22 RX ORDER — LIDOCAINE HYDROCHLORIDE 10 MG/ML
INJECTION INFILTRATION; PERINEURAL AS NEEDED
Status: DISCONTINUED | OUTPATIENT
Start: 2021-06-22 | End: 2021-06-22 | Stop reason: HOSPADM

## 2021-06-22 RX ORDER — FENTANYL CITRATE 50 UG/ML
INJECTION, SOLUTION INTRAMUSCULAR; INTRAVENOUS AS NEEDED
Status: DISCONTINUED | OUTPATIENT
Start: 2021-06-22 | End: 2021-06-22 | Stop reason: HOSPADM

## 2021-06-22 RX ORDER — HEPARIN SODIUM 1000 [USP'U]/ML
INJECTION, SOLUTION INTRAVENOUS; SUBCUTANEOUS AS NEEDED
Status: DISCONTINUED | OUTPATIENT
Start: 2021-06-22 | End: 2021-06-22 | Stop reason: HOSPADM

## 2021-06-22 RX ORDER — HEPARIN SODIUM 200 [USP'U]/100ML
INJECTION, SOLUTION INTRAVENOUS
Status: COMPLETED | OUTPATIENT
Start: 2021-06-22 | End: 2021-06-22

## 2021-06-22 RX ORDER — MIDAZOLAM HYDROCHLORIDE 1 MG/ML
INJECTION, SOLUTION INTRAMUSCULAR; INTRAVENOUS AS NEEDED
Status: DISCONTINUED | OUTPATIENT
Start: 2021-06-22 | End: 2021-06-22 | Stop reason: HOSPADM

## 2021-06-22 RX ORDER — SODIUM CHLORIDE 9 MG/ML
75 INJECTION, SOLUTION INTRAVENOUS CONTINUOUS
Status: DISCONTINUED | OUTPATIENT
Start: 2021-06-22 | End: 2021-06-22 | Stop reason: HOSPADM

## 2021-06-22 NOTE — Clinical Note
TRANSFER - IN REPORT:     Verbal report received from: Keven. Report consisted of patient's Situation, Background, Assessment and   Recommendations(SBAR). Opportunity for questions and clarification was provided. Assessment completed upon patient's arrival to unit and care assumed. Patient transported with a Registered Nurse.

## 2021-06-22 NOTE — Clinical Note
TRANSFER - OUT REPORT:     Verbal report given to: Irina Witt. Report consisted of patient's Situation, Background, Assessment and   Recommendations(SBAR). Opportunity for questions and clarification was provided. Patient transported with a Registered Nurse. Patient transported to: recovery.

## 2021-06-22 NOTE — INTERVAL H&P NOTE
Update History & Physical 
 
The Patient's History and Physical of {6/11/21, was reviewed with the patient and I examined the patient. There was no change. Pt did not want to proceed with stress test / LHC was scheduled Plan:  The risk, benefits, expected outcome, and alternative to the recommended procedure have been discussed with the patient. Patient understands and wants to proceed with the procedure.  
 
Electronically signed by Ilana Abreu MD on 6/22/2021 at 12:37 PM

## 2021-06-22 NOTE — ROUTINE PROCESS
11:42 AM 
Patient arrived. ID and allergies verified verbally with patient. Pt voices understanding of procedure to be performed. Consent obtained. Pt prepped for procedure. 1:32 PM 
TRANSFER - OUT REPORT: 
 
Verbal report given to seferino ortiz.(name) on Nanette Martínez  being transferred to cath lab(unit) for ordered procedure Report consisted of patients Situation, Background, Assessment and  
Recommendations(SBAR). Information from the following report(s) SBAR was reviewed with the receiving nurse. Lines:  
Peripheral IV 06/22/21 Right Antecubital (Active) Site Assessment Clean, dry, & intact 06/22/21 1315 Phlebitis Assessment 0 06/22/21 1315 Dressing Status Clean, dry, & intact 06/22/21 1315 Dressing Type Transparent 06/22/21 1315 Hub Color/Line Status Blue 06/22/21 1315 Opportunity for questions and clarification was provided. Patient transported with: 
 Registered Nurse 2:25 PM 
TRANSFER - IN REPORT: 
 
Verbal report received from seferino Christiansen.(name) on Nanette Martínez  being received from cath lab(unit) for routine progression of care Report consisted of patients Situation, Background, Assessment and  
Recommendations(SBAR). Information from the following report(s) Procedure Summary was reviewed with the receiving nurse. Opportunity for questions and clarification was provided. Assessment completed upon patients arrival to unit and care assumed. 3:19 PM 
2 ml air released from TR Band. No bleeding or hematoma noted. Radial and Ulnar pulse on r wrist palpable. Pt tolerated well. Will continue to monitor. 3:26 PM 
3 ml air released from TR Band. No bleeding or hematoma noted. Radial and Ulnar pulse on r wrist palpable. Pt tolerated well. Will continue to monitor. 3:32 PM 
3 ml air released from TR Band. No bleeding or hematoma noted. Radial and Ulnar pulse on r wrist palpable. Pt tolerated well. Will continue to monitor.  
 
3:38 PM 
3 ml air released from TR Band. No bleeding or hematoma noted. Radial and Ulnar pulse on r wrist palpable. Pt tolerated well. Will continue to monitor. 3:43 PM 
3 ml air released from TR Band. No bleeding or hematoma noted. Radial and Ulnar pulse on r wrist palpable. Pt tolerated well. Will continue to monitor. Air release completed. TR Band removed from r wrist. No bleeding or  Hematoma. Dressing applied. Wrist immobilizer in place. Radial and ulnar pulse remain palpable on affected extremity. Pt tolerated well. Instructions given to pt regarding movement and activity restrictions. Pt voiced understanding. [address]. Patient ambulates in hallway or on unit. Pt discharged via wheelchair with spouse. Personal belongings with patient upon discharge. Discharge instructions reviewed with patient and family. Voiced understanding. Patient given copy of discharge instructions to take home.

## 2021-06-22 NOTE — DISCHARGE INSTRUCTIONS
Berto Gutiérrez MD    [address] [facility], [address] [facility]    Office [phone],S [phone]    Patient ID:  Lamar Cooper  [id number]  13 y.o.  1950    Admit Date: 6/22/2021    Discharge Date: 6/22/2021     Admitting Physician: Berto Gutiérrez MD     Discharge Physician: Berto Gutiérrez MD    Admission Diagnoses:   Chest pain, unspecified type [R07.9]    Discharge Diagnoses: Active Problems:    * No active hospital problems. *      Discharge Condition: Good    Cardiology Procedures this Admission:  Diagnostic left heart catheterization    Disposition: home    Patient Instructions:     Cardiac Catheterization/Angiography Discharge Instructions    *Check the puncture site frequently for swelling or bleeding. If you see any bleeding, lie down and apply pressure over the area and call 911. Notify your doctor for any redness, swelling, drainage or oozing from the puncture site. Notify your doctor for any fever or chills. *If the arm with the puncture becomes cold, numb or painful, call your cardiologist.    *Activity should be limited for the next 48 hours. Climb stairs as little as possible and avoid any stooping, bending or strenuous activity for 48 hours. No heavy lifting (anything over 10 pounds) for 5 days. *Do not drive for 24 hours. *You may resume your usual diet. Drink more fluids than usual.    *Have a responsible person drive you home and stay with you for at least 24 hours after your heart catheterization/angiography. *You may remove the bandage from your groin in 24 hours. You may shower in 24 hours. No tub baths, hot tubs or swimming for one week. Do not place any lotions, creams, powders, ointments over the puncture site for one week. You may place a clean band-aid over the puncture site each day for 5 days. Change this daily. Reference discharge instructions provided by nursing for diet and activity.     Follow-up with Dr Janis Stacy as scheduled    Signed:  Steffanie Ortega MD  6/22/2021  2:27 PM      Radial Cardiac Catheterization/Angiography Discharge Instructions    It is normal to feel tired the first couple days. Take it easy and follow the physicians instructions. CHECK THE CATHETER INSERTION SITE DAILY:    Remove the wrist dressing 24 hours after the procedure. You may shower 24 hours after the procedure. Wash with soap and water and pat dry. Gentle cleaning of the site with soap and water is sufficient, cover with a dry clean dressing or bandage. Do not apply creams or powders to the area. No soaking the wrist for 3 days. Leave the puncture site open to air after 24 hours post-procedure. CALL THE PHYSICIANS:     If the site becomes red, swollen or feels warm to the touch  If there is bleeding or drainage or if there is unusual pain at the radial site. If there is any minor oozing, you may apply a band-aid and remove after 12 hours. If the bleeding continues, hold pressure with the middle finger against the puncture site and the thumb against the back of the wrist,call 911 to be transported to the hospital.  DO NOT DRIVE YOURSELF, [place] [address]. ACTIVITY:   For the first 24 hours do not manipulate the wrist.  No lifting, pushing or pulling over 3-5 pounds with the affected wrist for 7 daysand no straining the insertion site. Do not life grocery bags or the garbage can, do not run the vacuum  or  for 7 days. Start with short walks as in the hospital and gradually increase as tolerated each day. It is recommended to walk 30 minutes 5-7 days per week. Follow your physicians instructions on activity. Avoid walking outside in extremes of heat or cold. Walk inside when it is cold and windy or hot and humid. Things to keep in mind:  No driving for at least 24 hours, or as designated by your physician.   Limit the number of times you go up and down the stairs  Take rests and pace yourself with activity. Be careful and do not strain with bowel movements. MEDICATIONS:    Take all medications as prescribed  Call your physician if you have any questions  Keep an updated list of your medications with you at all times and give a list to your physician and pharmacist    SIGNS AND SYMPTOMS:   Be cautious of symptoms of angina or recurrent symptoms such as chest discomfort, unusual shortness of breath or fatigue. These could be symptoms of restenosis, a new blockage or a heart attack. If your symptoms are relieved with rest it is still recommended that you notify your physician of recurrent chest pain or discomfort. For CHEST PAIN or symptoms of angina not relieved with rest:  If the discomfort is not relieved with rest, and you have been prescribed Nitroglycerin, take as directed (taken under the tongue, one at a time 5 minutes apart for a total of 3 doses). If the discomfort is not relieved after the 3rd nitroglycerin, call 911. If you have not been prescribed Nitroglycerin  and your chest discomfort is not relieved with rest, call 911. AFTER CARE:   Follow up with your physician as instructed. Follow a heart healthy diet with proper portion control, daily stress management, daily exercise, blood pressure and cholesterol control , and smoking cessation.

## 2021-06-22 NOTE — PROCEDURES
BRIEF PROCEDURE NOTE    Date of Procedure: 6/22/2021   Preoperative Diagnosis: Chest Pain  Postoperative Diagnosis: No sig CAD    Procedure: Left heart cath, LV angiography, coronary angiography  Interventional Cardiologist: Delbert Bianchi MD  Assistant : none  Anesthesia: local + IV sedation  Estimated Blood Loss: Minimal  Findings:     R Radial access - 6 F sheath  RCA - JR4; LCA - JL4    L Main: Very Short; Nml    LAD: Tortuous; Med; Mid 30%; D1/D2- MLI    LCflex: Large; MLI; Small OM1 and OM2; Large OM3 - MLI    RCA: Dominant; Mid 20%; Small PDA; Very small PLB     LVEDP:  8 mm Hg    LVEF: Not assessed    No significant gradient across aortic valve. PCI: none      Specimens Removed : None    Complications: None    Closure Device: R Radial - TR band        See full cath note.     Complications: none    Delbert Bianchi MD

## 2021-12-09 NOTE — PROGRESS NOTES
Wendy Tee MD., [facility]    [address] [place], [address] [facility]    Office [phone],[facility] [phone]           Reymundo Hernandez is a 66 y.o. female - f/u visit  CC - as documented in EMR    Dear Dr. Wesley Sousa, DO    I had the pleasure of seeing Ms. Reymundo Hernandez in the office today. Assessment:   Chest pain- atypical - s/p Hiatal hernia surgery Nov 18 2019 at [facility]  CAD -nonobstructive disease by cardiac cath 6/2021   Hypertension  Hyperlipidemia  History of CVA  Dizziness-. She has been seen by ENT physician and has been referred to neurology. Anxiety    Plan:    Echocardiogram 3/2021 - Nml EF   C 6/22/21 - no sig CAD  (Pt does not want to do any kind of stress test because of her prior experience with it.)    Target LDL less than 70. Continue statin. Blood pressure controlled  Continue Coreg/cardizem -, can come off the Cardizem. However since patient feels that she is doing well she does not want to change medications. Will reevaluate next visit. .  Aggressive cardiovascular risk factor modification. Follow-up in 3 months/earlier as needed    Patient understands the plan. All questions were answered to the patient's satisfaction. I appreciate the opportunity to be involved in Ms. Schmidt. See note below for details. Please do not hesitate to contact us   with questions or concerns. Wendy Tee MD      Cardiac Testing/ Procedures: A. Cardiac Cath/PCI:  6/22/21  R Radial access - 6 F sheath  RCA - JR4; LCA - JL4     L Main: Very Short; Nml     LAD: Tortuous; Med; Mid 30%; D1/D2- MLI     LCflex: Large; MLI; Small OM1 and OM2; Large OM3 - MLI     RCA: Dominant; Mid 20%;  Small PDA; Very small PLB      LVEDP:  8 mm Hg     LVEF: Not assessed     No significant gradient across aortic valve.     PCI: none        Specimens Removed : None     Complications: None     Closure Device: R Radial - TR band      5/23/17-N STEMI-CJ W; left main-normal, LAD-mid 30-40%, left uofomjkuzcVD9-58-16%, RCA-medium vessel small PDA; itjkyytg15%    B. ECHO/BRITTA: 3/24/21 EF 55-60%; Grade 1 DD    C. StressNuclear/Stress ECHO/Stress test:    D.Vascular:    E. EP:    F. Miscellaneous:    History:     Kathy Campos is a 66 y.o. female who is referred for evaluation of chest pain. CP improved    Hx of dizziness - chronic. Past Medical/Surgical and Family/Social History:     Past Medical History:   Diagnosis Date    Arthritis     Asthma     CAD (coronary artery disease)     MI 5/2017    Diverticula of colon     Gastrointestinal disorder     Hypertension     Neurological disorder     CVA     Stroke [facility])       Past Surgical History:   Procedure Laterality Date    HX APPENDECTOMY      HX CHOLECYSTECTOMY      HX GYN      hysterectomy    HX ORTHOPAEDIC      left knee replacement     Family History   Problem Relation Age of Onset    Stroke Mother     Coronary Art Dis Father       Social History     Tobacco Use    Smoking status: Former Smoker    Smokeless tobacco: Never Used   Substance Use Topics    Alcohol use: No    Drug use: Not on file            Medications:       Current Outpatient Medications   Medication Sig Dispense    dilTIAZem IR (CARDIZEM) 60 mg tablet 60 mg daily. 1/2 tablet twice daily     ALPRAZolam (XANAX) 0.5 mg tablet TAKE 1 TABLET BY MOUTH ONCE DAILY AS NEEDED     carvedilol (COREG) 6.25 mg tablet Take 3.125 mg by mouth two (2) times daily (with meals). 1/2 tablet twice a day     omeprazole (PRILOSEC) 40 mg capsule Take 40 mg by mouth daily.  pravastatin (PRAVACHOL) 40 mg tablet Take 40 mg by mouth nightly. 1/2 tablet     aspirin (ASPIRIN) 325 mg tablet Take 325 mg by mouth daily.  ascorbic acid (VITAMIN C PO) Take  by mouth daily. (Patient not taking: Reported on 12/10/2021)     elderberry fruit (ELDERBERRY PO) Take  by mouth daily.  (Patient not taking: Reported on 12/10/2021)     Multivitamins with Fluoride (MULTI-VITAMIN PO) Take  by mouth daily. (Patient not taking: Reported on 12/10/2021)     vitamin E (AQUA GEMS) 400 unit capsule Take  by mouth daily. (Patient not taking: Reported on 12/10/2021)     cholecalciferol, vitamin D3, (VITAMIN D3) 2,000 unit tab Take  by mouth daily. (Patient not taking: Reported on 12/10/2021)      No current facility-administered medications for this visit. Review of Systems:     (bold if positive, if negative)    As in HPI  Physical Exam:     Visit Vitals  /78 (BP 1 Location: Left upper arm, BP Patient Position: Sitting)   Pulse 74   Ht 5' 3\" (1.6 m)   Wt 151 lb 9.6 oz (68.8 kg)   SpO2 93%   BMI 26.85 kg/m²          Gen: Well-developed, well-nourished, in no acute distress  Neck: Supple,No JVD, No Carotid Bruit,   Resp: No accessory muscle use, Clear breath sounds, No rales or rhonchi  Card: Regular Rate,Rythm,Normal S1, S2, No murmurs, rubs or gallop. No thrills. Abd:  Soft, non-tender, non-distended,BS+,   MSK: No cyanosis  Skin: No rashes    Neuro: moving all four extremities , follows commands appropriately  Psych:  Good insight, oriented to person, place , alert, Nml Affect  LE: No edema    EKG:       Medication Side Effects and Warnings were discussed with patient: yes  Patient Labs were reviewed and/or requested:  yes  Patient Past Records were reviewed and/or requested: yes    Total time:       mins    ATTENTION:   This medical record was transcribed using an electronic medical records/speech recognition system. Although proofread, it may and can contain electronic, spelling and other errors. Corrections may be executed at a later time. Please feel free to contact us for any clarifications as needed.       Brittani Kaiser MD

## 2021-12-10 ENCOUNTER — OFFICE VISIT (OUTPATIENT)
Dept: CARDIOLOGY CLINIC | Age: 71
End: 2021-12-10
Payer: MEDICARE

## 2021-12-10 VITALS
HEIGHT: 63 IN | SYSTOLIC BLOOD PRESSURE: 124 MMHG | BODY MASS INDEX: 26.86 KG/M2 | HEART RATE: 74 BPM | OXYGEN SATURATION: 93 % | WEIGHT: 151.6 LBS | DIASTOLIC BLOOD PRESSURE: 78 MMHG

## 2021-12-10 DIAGNOSIS — I10 ESSENTIAL HYPERTENSION: Primary | ICD-10-CM

## 2021-12-10 DIAGNOSIS — R42 DIZZINESS: ICD-10-CM

## 2021-12-10 DIAGNOSIS — E78.5 DYSLIPIDEMIA: ICD-10-CM

## 2021-12-10 PROCEDURE — G8510 SCR DEP NEG, NO PLAN REQD: HCPCS | Performed by: INTERNAL MEDICINE

## 2021-12-10 PROCEDURE — G8419 CALC BMI OUT NRM PARAM NOF/U: HCPCS | Performed by: INTERNAL MEDICINE

## 2021-12-10 PROCEDURE — G8400 PT W/DXA NO RESULTS DOC: HCPCS | Performed by: INTERNAL MEDICINE

## 2021-12-10 PROCEDURE — 1090F PRES/ABSN URINE INCON ASSESS: CPT | Performed by: INTERNAL MEDICINE

## 2021-12-10 PROCEDURE — G8536 NO DOC ELDER MAL SCRN: HCPCS | Performed by: INTERNAL MEDICINE

## 2021-12-10 PROCEDURE — 99214 OFFICE O/P EST MOD 30 MIN: CPT | Performed by: INTERNAL MEDICINE

## 2021-12-10 PROCEDURE — 3017F COLORECTAL CA SCREEN DOC REV: CPT | Performed by: INTERNAL MEDICINE

## 2021-12-10 PROCEDURE — G8754 DIAS BP LESS 90: HCPCS | Performed by: INTERNAL MEDICINE

## 2021-12-10 PROCEDURE — G0463 HOSPITAL OUTPT CLINIC VISIT: HCPCS | Performed by: INTERNAL MEDICINE

## 2021-12-10 PROCEDURE — G8752 SYS BP LESS 140: HCPCS | Performed by: INTERNAL MEDICINE

## 2021-12-10 PROCEDURE — 1101F PT FALLS ASSESS-DOCD LE1/YR: CPT | Performed by: INTERNAL MEDICINE

## 2021-12-10 PROCEDURE — G8427 DOCREV CUR MEDS BY ELIG CLIN: HCPCS | Performed by: INTERNAL MEDICINE

## 2021-12-10 RX ORDER — DILTIAZEM HYDROCHLORIDE 60 MG/1
60 TABLET, FILM COATED ORAL DAILY
COMMUNITY
Start: 2021-10-21 | End: 2022-03-14 | Stop reason: ALTCHOICE

## 2021-12-10 NOTE — LETTER
12/10/2021    Patient: Abraham Bautista   YOB: 1950   Date of Visit: 12/10/2021     [address]  [phone]  Via Fax: [phone]    Dear Zachariah Miller MD,      Thank you for referring Ms. Yelena Estrada to [facility] for evaluation. My notes for this consultation are attached. If you have questions, please do not hesitate to call me. I look forward to following your patient along with you.       Sincerely,    Yazmin Villa MD

## 2021-12-10 NOTE — PROGRESS NOTES
Aliza Rocha is a 66 y.o. female    Chief Complaint   Patient presents with    Coronary Artery Disease    Cholesterol Problem    Hypertension     Patient had states she had heartburn a few weeks ago. Chest pain No    SOB patient states SOB is normal due to asthma    Dizziness some dizziness due to vertigo     Swelling No    Refills No    Visit Vitals  /78 (BP 1 Location: Left upper arm, BP Patient Position: Sitting)   Pulse 74   Ht 5' 3\" (1.6 m)   Wt 151 lb 9.6 oz (68.8 kg)   SpO2 93%   BMI 26.85 kg/m²       1. Have you been to the ER, urgent care clinic since your last visit? Hospitalized since your last visit? No    2. Have you seen or consulted any other health care providers outside of the [address] [place] [address] since your last visit? Include any pap smears or colon screening.   No

## 2021-12-10 NOTE — PROGRESS NOTES
Lucas Napier is a 66 y.o. female    There were no vitals taken for this visit.     Chief Complaint   Patient presents with    Coronary Artery Disease    Cholesterol Problem    Hypertension

## 2022-01-13 LAB
SARS-COV-2 AB, IGG QL: POSITIVE
SARS-COV-2: POSITIVE

## 2022-02-28 ENCOUNTER — OFFICE VISIT (OUTPATIENT)
Dept: NEUROLOGY | Age: 72
End: 2022-02-28
Payer: MEDICARE

## 2022-02-28 VITALS
BODY MASS INDEX: 26.58 KG/M2 | DIASTOLIC BLOOD PRESSURE: 78 MMHG | OXYGEN SATURATION: 97 % | RESPIRATION RATE: 14 BRPM | SYSTOLIC BLOOD PRESSURE: 147 MMHG | TEMPERATURE: 97.8 F | WEIGHT: 150 LBS | HEIGHT: 63 IN | HEART RATE: 72 BPM

## 2022-02-28 DIAGNOSIS — G45.0 VERTEBROBASILAR INSUFFICIENCY: Primary | ICD-10-CM

## 2022-02-28 DIAGNOSIS — R42 VERTIGO: ICD-10-CM

## 2022-02-28 DIAGNOSIS — I63.9 CEREBROVASCULAR ACCIDENT (CVA), UNSPECIFIED MECHANISM (HCC): ICD-10-CM

## 2022-02-28 DIAGNOSIS — R26.81 GAIT INSTABILITY: ICD-10-CM

## 2022-02-28 PROCEDURE — 1101F PT FALLS ASSESS-DOCD LE1/YR: CPT | Performed by: PSYCHIATRY & NEUROLOGY

## 2022-02-28 PROCEDURE — G8419 CALC BMI OUT NRM PARAM NOF/U: HCPCS | Performed by: PSYCHIATRY & NEUROLOGY

## 2022-02-28 PROCEDURE — 99205 OFFICE O/P NEW HI 60 MIN: CPT | Performed by: PSYCHIATRY & NEUROLOGY

## 2022-02-28 PROCEDURE — 3017F COLORECTAL CA SCREEN DOC REV: CPT | Performed by: PSYCHIATRY & NEUROLOGY

## 2022-02-28 PROCEDURE — G8427 DOCREV CUR MEDS BY ELIG CLIN: HCPCS | Performed by: PSYCHIATRY & NEUROLOGY

## 2022-02-28 PROCEDURE — G8536 NO DOC ELDER MAL SCRN: HCPCS | Performed by: PSYCHIATRY & NEUROLOGY

## 2022-02-28 PROCEDURE — G8400 PT W/DXA NO RESULTS DOC: HCPCS | Performed by: PSYCHIATRY & NEUROLOGY

## 2022-02-28 PROCEDURE — G8510 SCR DEP NEG, NO PLAN REQD: HCPCS | Performed by: PSYCHIATRY & NEUROLOGY

## 2022-02-28 PROCEDURE — 1090F PRES/ABSN URINE INCON ASSESS: CPT | Performed by: PSYCHIATRY & NEUROLOGY

## 2022-02-28 RX ORDER — LORAZEPAM 1 MG/1
TABLET ORAL
Qty: 2 TABLET | Refills: 0 | Status: SHIPPED | OUTPATIENT
Start: 2022-02-28 | End: 2022-03-14

## 2022-02-28 RX ORDER — ALBUTEROL SULFATE 90 UG/1
AEROSOL, METERED RESPIRATORY (INHALATION)
COMMUNITY
Start: 2022-01-13

## 2022-02-28 RX ORDER — MECLIZINE HYDROCHLORIDE 25 MG/1
TABLET ORAL
COMMUNITY
Start: 2021-12-15 | End: 2022-03-14

## 2022-02-28 NOTE — PROGRESS NOTES
NEUROLOGY CLINIC NOTE    Patient ID:  Ioana Cm  [id number]  64 y.o.  1950    Date of Consultation:  February 28, 2022    Referring Physician: Dr. aCthy Linder    Reason for Consultation:  Gait issues    Chief Complaint   Patient presents with    New Patient     Vertigo, imbalance x 3 months // referred by ENT - Dr Robb Emmet // Card's - Dr Darrion Epstein // [facility] - PT last summer- helped  // walks with a cane - forgot it today     Other     [facility] currently x 2 months - PCP referred for current vertigo episode // meclizine does not help        History of Present Illness:     Patient Active Problem List    Diagnosis Date Noted    CAD (coronary artery disease) 10/20/2017     Past Medical History:   Diagnosis Date    Arthritis     Asthma     CAD (coronary artery disease)     MI 5/2017    Diverticula of colon     Gastrointestinal disorder     Hypertension     Neurological disorder     CVA     Stroke [facility])       Past Surgical History:   Procedure Laterality Date    HX APPENDECTOMY      HX CHOLECYSTECTOMY      HX GYN      hysterectomy    HX ORTHOPAEDIC      left knee replacement      Prior to Admission medications    Medication Sig Start Date End Date Taking? Authorizing Provider   albuterol (PROVENTIL HFA, VENTOLIN HFA, PROAIR HFA) 90 mcg/actuation inhaler INHALE 2 PUFFS INTO THE LUNGS EVERY 4 HOURS AS NEEDED FOR 30 DAYS 1/13/22  Yes Provider, Historical   dilTIAZem IR (CARDIZEM) 60 mg tablet 60 mg daily. 1/2 tablet twice daily 10/21/21  Yes Provider, Historical   ascorbic acid (VITAMIN C PO) Take  by mouth daily. Yes Provider, Historical   elderberry fruit (ELDERBERRY PO) Take  by mouth daily. Yes Provider, Historical   ALPRAZolam (XANAX) 0.5 mg tablet TAKE 1 TABLET BY MOUTH ONCE DAILY AS NEEDED 4/13/21  Yes Provider, Historical   Multivitamins with Fluoride (MULTI-VITAMIN PO) Take  by mouth daily.    Yes Provider, Historical   carvedilol (COREG) 6.25 mg tablet Take 3.125 mg by mouth two (2) times daily (with meals). 1/2 tablet twice a day   Yes Provider, Historical   omeprazole (PRILOSEC) 40 mg capsule Take 40 mg by mouth daily. Yes Provider, Historical   pravastatin (PRAVACHOL) 40 mg tablet Take 40 mg by mouth nightly. 1/2 tablet   Yes Provider, Historical   aspirin (ASPIRIN) 325 mg tablet Take 325 mg by mouth daily. Yes Provider, Historical   vitamin E (AQUA GEMS) 400 unit capsule Take  by mouth daily. Yes Provider, Historical   cholecalciferol, vitamin D3, (VITAMIN D3) 2,000 unit tab Take  by mouth daily. Yes Provider, Historical   meclizine (ANTIVERT) 25 mg tablet 1 TABLET AS NEEDED ORALLY TWICE A DAY FOR DIZZINESS 30 DAY(S)  Patient not taking: Reported on 2/28/2022 12/15/21   Provider, Historical     Allergies   Allergen Reactions    Latex Hives    Morphine Anaphylaxis    Ciprofloxacin Unknown (comments)    Hydrocodone Unknown (comments)    Levaquin [Levofloxacin] Unknown (comments)    Other Medication Other (comments)     Per pt- No \"nuclear\" medicine     Pcn [Penicillins] Unknown (comments)    Shellfish Derived Unknown (comments)    Sulfa (Sulfonamide Antibiotics) Unknown (comments)      Social History     Tobacco Use    Smoking status: Former Smoker    Smokeless tobacco: Never Used   Substance Use Topics    Alcohol use: No      Family History   Problem Relation Age of Onset    Stroke Mother     Coronary Art Dis Father         Subjective:      Blanca Sanders is a 66 y.o. UMPF with history of CAD, HTN, hyperlipidemia, history of CVA, anxiety who was referred here by Dr. Araceli Portillo for further evaluation of her dizziness and gait issues. November 2021 after hiatal hernia repair. Condition started 2 weeks after.   2 weeks constant when up and about or changes in position  Had to be helped by a family member to do things  Leans left make her feel lightheaded and floating associated with nausea and feelings of passing out  Sofa head back and get up, it would be triggered  Less intense now and able to do things  Now able to walk without her cane  Less episodes of vertigo  Meclizine offers no benefit  Called PCP and was told to see therapist. Did PT. Had a prior bout last June 2021  Saw Dr. Ching Silverman and was sent to balance therapy. Brain MRI with and without contrast for bilateral sensorineural hearing loss 4/7/2021 which revealed mild to moderate periventricular and deep white matter consistent with chronic microvascular ischemic disease. Hearing improved when vertigo improved. Review of records revealed:  Patient was seen by cardiology last 12/10/2021 for chest pain and history of chronic dizziness. Note mentions echocardiogram done March 2021 revealed normal EF. Heart catheterization done 6/22/2021 revealed no significant CAD. Blood pressure was in better control and was advised that she can come off Cardizem. Outside reports reviewed: office notes. Review of Systems:    A comprehensive review of systems was performed:   Constitutional: positive for none  Eyes: positive for none  Ears, nose, mouth, throat, and face: positive for none  Respiratory: positive for none  Cardiovascular: positive for none  Gastrointestinal: positive for none  Genitourinary: positive for none  Integument/breast: positive for none  Hematologic/lymphatic: positive for none  Musculoskeletal: positive for none  Neurological: positive for gait issues, dizziness  Behavioral/Psych: positive for none  Endocrine: positive for none  Allergic/Immunologic: positive for none      Objective:     Visit Vitals  BP (!) 147/78 (BP 1 Location: Left upper arm, BP Patient Position: Sitting)   Pulse 72   Temp 97.8 °F (36.6 °C) (Temporal)   Resp 14   Ht 5' 3\" (1.6 m)   Wt 68 kg (150 lb)   SpO2 97%   BMI 26.57 kg/m²       PHYSICAL EXAM:    General Appearance: Alert, patient appears stated age. General:  Well developed, well nourished, patient in no apparent distress. Head/Face:  The head is normocephalic and atraumatic. Eyes: Conjunctivae appear normal. Sclera appear normal and non-icteric. Nose (and Sinus):   No abnormality of the nose or sinuses is noted. Oral:   Throat is clear. Lymphatics:  No lymphadenopathy in the neck/head. Neck and Thyroid:   No bruits noted in the neck. Respiratory:  Lungs clear to auscultation. Cardiovascular:  Palpation and auscultation: regular rate and rhythm. Extremity: No joint swelling, erythema or pedal edema. NEUROLOGICAL EXAM:    Appearance: The patient is well developed, well nourished, provides a coherent history and is in no acute distress. Mental Status: Oriented to time, place and person. Fluent, no aphasia or dysarthria. Mood and affect appropriate. Cranial Nerves:   Intact visual fields. MOLLY, EOM's full, no nystagmus, no ptosis. Facial sensation is normal. Corneal reflexes are intact. Facial movement is symmetric. Hearing is normal bilaterally. Palate is midline with normal elevation. Sternocleidomastoid and trapezius muscles are normal. Tongue is midline. Motor:  5/5 strength in upper and lower proximal and distal muscles. Normal bulk and tone. No fasciculations. No pronator drift. Reflexes:   Deep tendon reflexes 1+/4 and symmetrical. Downgoing toes. Sensory:   Normal to cold, pinprick and vibration. Gait:  Antalgic and wide based gait. No Romberg. Mild truncal instability and problems with tandem walking. Tremor:   No tremor noted. Cerebellar:  Intact FTN/BRET/HTS. Neurovascular:  Normal heart sounds and regular rhythm, peripheral pulses intact, and no carotid bruits. Imaging  Brain MRI with and without contrast: reviewed    Assessment:   Vertebrobasilar insufficiency  Vertigo  Gait instability  CVA    Plan:   Neurological examination reveals antalgic gait that is wide-based with mild truncal instability and problems with tandem walking.   History and abrupt onset after surgery is concerning for stroke as well as significant posterior circulation (basilar and vertebral artery) stenosis. Need to also rule out carotid artery stenosis. MRA of the head and neck was ordered to further assess. Brain MRI was ordered to assess for possible brainstem or cerebellar stroke. Patient with chronic issues of dizziness and vertigo. Previously seen by ENT and has had vestibular and balance therapy. Still having recurrence. Prior brain MRI with and without contrast done 4/7/2021 revealed mild to moderate periventricular and deep white matter disease consistent with chronic microvascular ischemic changes. Head and neck MRA and brain MRI was ordered as above. Patient having issues with wobbliness and gait instability worse when having episodes of dizziness and vertigo. Again consider cerebellar or brainstem stroke. Neuroimaging as above. Patient on past medical history is a history of stroke. Prior brain MRI done 4/7/2021 did not reveal any old strokes. Mild to moderate periventricular and deep white matter disease consistent with chronic microvascular ischemic changes. However abrupt onset of dizziness/vertigo and gait instability is concerning for brainstem or cerebellar stroke. Brain MRI was ordered as above. Further intervention be done pending results of testing. I need to obtain a copy of her medical records from her PCP. Continue aspirin 325 mg daily for stroke prophylaxis. Call 911 if new deficits occur. All questions and concerns were answered. Visit lasted 60 minutes.   Greater than 50% was spent reviewing available medical records especially MRI done as summarized above, discussion about her condition, potential etiology, prognosis, concern for possible new stroke involving the brainstem and cerebellum, need for brain MRI, possible basilar or vertebral artery stenosis or pathology and need for head and neck MRA, stroke prophylaxis and prevention    This note was created using voice recognition software. Despite editing, there may be syntax errors.

## 2022-02-28 NOTE — LETTER
3/2/2022    Patient: Rahul Warren   YOB: 1950   Date of Visit: 2/28/2022     Edda Goff DO  [address] [address]  Via Fax: [phone]    Dear Mariposa Chavez DO,      Thank you for referring Ms. Pedro Gonzalez to [facility] for evaluation. My notes for this consultation are attached. If you have questions, please do not hesitate to call me. I look forward to following your patient along with you.       Sincerely,    Rickie Evans MD

## 2022-02-28 NOTE — PROGRESS NOTES
Chief Complaint   Patient presents with    New Patient     Vertigo, imbalance x 3 months // referred by ENT - Dr Prem Ordoñez // Card's - Dr Ace Mendiola // [facility] - PT last summer- helped  // walks with a cane - forgot it today     Other     [facility] currently x 2 months - PCP referred for current vertigo episode // meclizine does not help      Presents alone today

## 2022-03-13 NOTE — PROGRESS NOTES
Sandi Mir MD., [facility]    [address] [place], [address] [facility]    Office [phone],[place] [phone]           Guzman Salcedo is a 66 y.o. female - f/u visit  CC - as documented in EMR    Dear Dr. Jenny Bueno, DO    I had the pleasure of seeing Ms. Guzman Salcedo in the office today. Assessment:   Chest pain- atypical - s/p Hiatal hernia surgery Nov 18 2019 at [facility]  CAD -nonobstructive disease by cardiac cath 6/2021   Hypertension  Hyperlipidemia  History of CVA  Dizziness-. She has been seen by ENT physician and has been referred to neurology. Scheduled to get MRI later this week. Anxiety    Plan:    Echocardiogram 3/2021 - Nml EF   C 6/22/21 - no sig CAD  (Pt does not want to do any kind of stress test because of her prior experience with it.)    Target LDL less than 70. Continue statin. Patient on Coreg at half tablet twice daily and Cardizem half tablet twice daily. I asked her to stop the Cardizem and take Coreg 6.25 mg 1 tablet twice daily. Aggressive cardiovascular risk factor modification. Follow-up in 6 months/earlier as needed    Patient understands the plan. All questions were answered to the patient's satisfaction. I appreciate the opportunity to be involved in Ms. Schmidt. See note below for details. Please do not hesitate to contact us   with questions or concerns. Sandi iMr MD      Cardiac Testing/ Procedures: A. Cardiac Cath/PCI:  6/22/21  R Radial access - 6 F sheath  RCA - JR4; LCA - JL4     L Main: Very Short; Nml     LAD: Tortuous; Med; Mid 30%; D1/D2- MLI     LCflex: Large; MLI; Small OM1 and OM2; Large OM3 - MLI     RCA: Dominant; Mid 20%;  Small PDA; Very small PLB      LVEDP:  8 mm Hg     LVEF: Not assessed     No significant gradient across aortic valve.     PCI: none        Specimens Removed : None     Complications: None     Closure Device: R Radial - TR band      5/23/17-N STEMI-CJ W; left main-normal, LAD-mid 30-40%, left bxpmartaapQV6-64-14%, RCA-medium vessel small PDA; fjfhzoqi89%    B. ECHO/BRITTA: 3/24/21 EF 55-60%; Grade 1 DD    C. StressNuclear/Stress ECHO/Stress test:    D.Vascular:    E. EP:    F. Miscellaneous:    History:     Tiffany Jackson is a 66 y.o. female who is referred for evaluation of chest pain. CP improved    Hx of dizziness - chronic. Past Medical/Surgical and Family/Social History:     Past Medical History:   Diagnosis Date    Arthritis     Asthma     CAD (coronary artery disease)     MI 5/2017    Diverticula of colon     Gastrointestinal disorder     Hypertension     Neurological disorder     CVA     Stroke [facility])       Past Surgical History:   Procedure Laterality Date    HX APPENDECTOMY      HX CHOLECYSTECTOMY      HX GYN      hysterectomy    HX ORTHOPAEDIC      left knee replacement     Family History   Problem Relation Age of Onset    Stroke Mother     Coronary Art Dis Father       Social History     Tobacco Use    Smoking status: Former Smoker    Smokeless tobacco: Never Used   Substance Use Topics    Alcohol use: No    Drug use: Not on file            Medications:       Current Outpatient Medications   Medication Sig Dispense    albuterol (PROVENTIL HFA, VENTOLIN HFA, PROAIR HFA) 90 mcg/actuation inhaler INHALE 2 PUFFS INTO THE LUNGS EVERY 4 HOURS AS NEEDED FOR 30 DAYS     dilTIAZem IR (CARDIZEM) 60 mg tablet 60 mg daily. 1/2 tablet twice daily     ascorbic acid (VITAMIN C PO) Take  by mouth daily.  elderberry fruit (ELDERBERRY PO) Take  by mouth daily.  ALPRAZolam (XANAX) 0.5 mg tablet TAKE 1 TABLET BY MOUTH ONCE DAILY AS NEEDED     Multivitamins with Fluoride (MULTI-VITAMIN PO) Take  by mouth daily.  carvedilol (COREG) 6.25 mg tablet Take 3.125 mg by mouth two (2) times daily (with meals). 1/2 tablet twice a day     omeprazole (PRILOSEC) 40 mg capsule Take 40 mg by mouth daily.      pravastatin (PRAVACHOL) 40 mg tablet Take 40 mg by mouth nightly. 1/2 tablet     aspirin (ASPIRIN) 325 mg tablet Take 325 mg by mouth daily.  vitamin E (AQUA GEMS) 400 unit capsule Take  by mouth daily.  cholecalciferol, vitamin D3, (VITAMIN D3) 2,000 unit tab Take  by mouth daily. No current facility-administered medications for this visit. Review of Systems:     (bold if positive, if negative)    As in HPI  Physical Exam:     Visit Vitals  BP (!) 140/80 (BP 1 Location: Left upper arm, BP Patient Position: Sitting)   Pulse 70   Ht 5' 3\" (1.6 m)   Wt 150 lb (68 kg)   SpO2 96%   BMI 26.57 kg/m²          Gen: Well-developed, well-nourished, in no acute distress  Neck: Supple,No JVD, No Carotid Bruit,   Resp: No accessory muscle use, Clear breath sounds, No rales or rhonchi  Card: Regular Rate,Rythm,Normal S1, S2, No murmurs, rubs or gallop. No thrills. Abd:  Soft, ,BS+,   MSK: No cyanosis  Skin: No rashes    Neuro: moving all four extremities , follows commands appropriately  Psych:  Good insight, oriented to person, place , alert, Nml Affect  LE: No edema    EKG: Sinus Rhythm, normal axis, normal intervals      Medication Side Effects and Warnings were discussed with patient: yes  Patient Labs were reviewed and/or requested:  yes  Patient Past Records were reviewed and/or requested: yes    Total time:       mins    ATTENTION:   This medical record was transcribed using an electronic medical records/speech recognition system. Although proofread, it may and can contain electronic, spelling and other errors. Corrections may be executed at a later time. Please feel free to contact us for any clarifications as needed.       Kun Dickey MD

## 2022-03-14 ENCOUNTER — OFFICE VISIT (OUTPATIENT)
Dept: CARDIOLOGY CLINIC | Age: 72
End: 2022-03-14
Payer: MEDICARE

## 2022-03-14 VITALS
OXYGEN SATURATION: 96 % | HEIGHT: 63 IN | SYSTOLIC BLOOD PRESSURE: 140 MMHG | DIASTOLIC BLOOD PRESSURE: 80 MMHG | BODY MASS INDEX: 26.58 KG/M2 | HEART RATE: 70 BPM | WEIGHT: 150 LBS

## 2022-03-14 DIAGNOSIS — I10 ESSENTIAL HYPERTENSION: ICD-10-CM

## 2022-03-14 DIAGNOSIS — I25.10 CORONARY ARTERY DISEASE INVOLVING NATIVE CORONARY ARTERY OF NATIVE HEART WITHOUT ANGINA PECTORIS: Primary | ICD-10-CM

## 2022-03-14 DIAGNOSIS — E78.5 DYSLIPIDEMIA: ICD-10-CM

## 2022-03-14 PROCEDURE — 1101F PT FALLS ASSESS-DOCD LE1/YR: CPT | Performed by: INTERNAL MEDICINE

## 2022-03-14 PROCEDURE — G8536 NO DOC ELDER MAL SCRN: HCPCS | Performed by: INTERNAL MEDICINE

## 2022-03-14 PROCEDURE — G8400 PT W/DXA NO RESULTS DOC: HCPCS | Performed by: INTERNAL MEDICINE

## 2022-03-14 PROCEDURE — G0463 HOSPITAL OUTPT CLINIC VISIT: HCPCS | Performed by: INTERNAL MEDICINE

## 2022-03-14 PROCEDURE — 93005 ELECTROCARDIOGRAM TRACING: CPT | Performed by: INTERNAL MEDICINE

## 2022-03-14 PROCEDURE — G8427 DOCREV CUR MEDS BY ELIG CLIN: HCPCS | Performed by: INTERNAL MEDICINE

## 2022-03-14 PROCEDURE — G8754 DIAS BP LESS 90: HCPCS | Performed by: INTERNAL MEDICINE

## 2022-03-14 PROCEDURE — 3017F COLORECTAL CA SCREEN DOC REV: CPT | Performed by: INTERNAL MEDICINE

## 2022-03-14 PROCEDURE — 99214 OFFICE O/P EST MOD 30 MIN: CPT | Performed by: INTERNAL MEDICINE

## 2022-03-14 PROCEDURE — G8510 SCR DEP NEG, NO PLAN REQD: HCPCS | Performed by: INTERNAL MEDICINE

## 2022-03-14 PROCEDURE — 93010 ELECTROCARDIOGRAM REPORT: CPT | Performed by: INTERNAL MEDICINE

## 2022-03-14 PROCEDURE — 1090F PRES/ABSN URINE INCON ASSESS: CPT | Performed by: INTERNAL MEDICINE

## 2022-03-14 PROCEDURE — G8753 SYS BP > OR = 140: HCPCS | Performed by: INTERNAL MEDICINE

## 2022-03-14 PROCEDURE — G8419 CALC BMI OUT NRM PARAM NOF/U: HCPCS | Performed by: INTERNAL MEDICINE

## 2022-03-14 RX ORDER — CARVEDILOL 6.25 MG/1
6.25 TABLET ORAL 2 TIMES DAILY WITH MEALS
Qty: 180 TABLET | Refills: 3 | Status: SHIPPED | OUTPATIENT
Start: 2022-03-14 | End: 2022-03-15 | Stop reason: SDUPTHER

## 2022-03-14 NOTE — LETTER
3/16/2022    Patient: Bubba Ignacio   YOB: 1950   Date of Visit: 3/14/2022     Hermes Goff DO  [address] [address]  Via Fax: [phone]    Dear Iveth Crespo DO,      Thank you for referring Ms. Craig Molina to [facility] for evaluation. My notes for this consultation are attached. If you have questions, please do not hesitate to call me. I look forward to following your patient along with you.       Sincerely,    Melissa Andrade MD

## 2022-03-14 NOTE — PROGRESS NOTES
Blanca Sanders is a 66 y.o. female    Chief Complaint   Patient presents with    Follow-up     3 month f/u     Hypertension    Cholesterol Problem    Dizziness     MRI on Thursday vanessa and neck order by neurology     Chest pain some soreness patient had hernia surgery 11/19/2021    SOB some     Dizziness patient has vertigo     Swelling No    Refills No    Visit Vitals  BP (!) 140/80 (BP 1 Location: Left upper arm, BP Patient Position: Sitting)   Pulse 70   Ht 5' 3\" (1.6 m)   Wt 150 lb (68 kg)   SpO2 96%   BMI 26.57 kg/m²       1. Have you been to the ER, urgent care clinic since your last visit? Hospitalized since your last visit? No    2. Have you seen or consulted any other health care providers outside of the [address] [place] [address] since your last visit? Include any pap smears or colon screening.   No

## 2022-03-15 RX ORDER — CARVEDILOL 6.25 MG/1
6.25 TABLET ORAL 2 TIMES DAILY WITH MEALS
Qty: 180 TABLET | Refills: 3 | Status: SHIPPED | OUTPATIENT
Start: 2022-03-15

## 2022-03-15 NOTE — TELEPHONE ENCOUNTER
Refill per VO of  Je[facility]  Last appt: 3/14/2022  Future Appointments   Date Time Provider Ankit Weaveri   3/17/2022 11:00 AM JORGE MRI 2 OPEN [facility]   3/17/2022 12:00 PM JORGE MRI 2 OPEN [facility]   3/17/2022  1:00 PM JORGE MRI 2 OPEN [facility]   9/16/2022  2:00 PM Nadja Beltrán MD [facility]       Requested Prescriptions     Signed Prescriptions Disp Refills    carvediloL (COREG) 6.25 mg tablet 180 Tablet 3     Sig: Take 1 Tablet by mouth two (2) times daily (with meals).      Authorizing Provider: Darleen Bianchi     Ordering User: Jim Seip

## 2022-03-17 ENCOUNTER — HOSPITAL ENCOUNTER (OUTPATIENT)
Dept: MRI IMAGING | Age: 72
Discharge: HOME | End: 2022-03-17
Attending: PSYCHIATRY & NEUROLOGY
Payer: MEDICARE

## 2022-03-17 DIAGNOSIS — I63.9 CEREBROVASCULAR ACCIDENT (CVA), UNSPECIFIED MECHANISM (HCC): ICD-10-CM

## 2022-03-17 DIAGNOSIS — R42 VERTIGO: ICD-10-CM

## 2022-03-17 DIAGNOSIS — G45.0 VERTEBROBASILAR INSUFFICIENCY: ICD-10-CM

## 2022-03-17 DIAGNOSIS — R26.81 GAIT INSTABILITY: ICD-10-CM

## 2022-03-17 PROCEDURE — 70547 MR ANGIOGRAPHY NECK W/O DYE: CPT

## 2022-03-17 PROCEDURE — 70551 MRI BRAIN STEM W/O DYE: CPT

## 2022-03-17 PROCEDURE — 70544 MR ANGIOGRAPHY HEAD W/O DYE: CPT

## 2022-03-18 PROBLEM — I25.10 CAD (CORONARY ARTERY DISEASE): Status: ACTIVE | Noted: 2017-10-20

## 2022-03-22 ENCOUNTER — TELEPHONE (OUTPATIENT)
Dept: NEUROLOGY | Age: 72
End: 2022-03-22

## 2022-04-19 ENCOUNTER — OFFICE VISIT (OUTPATIENT)
Dept: NEUROLOGY | Age: 72
End: 2022-04-19
Payer: MEDICARE

## 2022-04-19 VITALS
RESPIRATION RATE: 17 BRPM | DIASTOLIC BLOOD PRESSURE: 84 MMHG | SYSTOLIC BLOOD PRESSURE: 132 MMHG | HEART RATE: 68 BPM | OXYGEN SATURATION: 96 %

## 2022-04-19 DIAGNOSIS — H81.12 BENIGN PAROXYSMAL POSITIONAL VERTIGO OF LEFT EAR: Primary | ICD-10-CM

## 2022-04-19 PROCEDURE — G8536 NO DOC ELDER MAL SCRN: HCPCS | Performed by: NURSE PRACTITIONER

## 2022-04-19 PROCEDURE — G8754 DIAS BP LESS 90: HCPCS | Performed by: NURSE PRACTITIONER

## 2022-04-19 PROCEDURE — 99213 OFFICE O/P EST LOW 20 MIN: CPT | Performed by: NURSE PRACTITIONER

## 2022-04-19 PROCEDURE — G8752 SYS BP LESS 140: HCPCS | Performed by: NURSE PRACTITIONER

## 2022-04-19 PROCEDURE — 1101F PT FALLS ASSESS-DOCD LE1/YR: CPT | Performed by: NURSE PRACTITIONER

## 2022-04-19 PROCEDURE — G8419 CALC BMI OUT NRM PARAM NOF/U: HCPCS | Performed by: NURSE PRACTITIONER

## 2022-04-19 PROCEDURE — G8400 PT W/DXA NO RESULTS DOC: HCPCS | Performed by: NURSE PRACTITIONER

## 2022-04-19 PROCEDURE — 1090F PRES/ABSN URINE INCON ASSESS: CPT | Performed by: NURSE PRACTITIONER

## 2022-04-19 PROCEDURE — G8432 DEP SCR NOT DOC, RNG: HCPCS | Performed by: NURSE PRACTITIONER

## 2022-04-19 PROCEDURE — G8428 CUR MEDS NOT DOCUMENT: HCPCS | Performed by: NURSE PRACTITIONER

## 2022-04-19 PROCEDURE — 3017F COLORECTAL CA SCREEN DOC REV: CPT | Performed by: NURSE PRACTITIONER

## 2022-04-19 NOTE — PATIENT INSTRUCTIONS
Benign Paroxysmal Positional Vertigo (BPPV): Care Instructions  Your Care Instructions     Benign paroxysmal positional vertigo, also called BPPV, is an inner ear problem. It causes a spinning or whirling sensation when you move your head. This sensation is called vertigo. The vertigo usually lasts for less than a minute. People often have vertigo spells for a few days or weeks. Then the vertigo goes away. But it may come back again. The vertigo may be mild, or it may be bad enough to cause unsteadiness, nausea, and vomiting. When you move, your inner ear sends messages to the brain. This helps you keep your balance. Vertigo can happen when debris builds up in the inner ear. The buildup can cause the inner ear to send the wrong message to the brain. Your doctor may move you in different positions to help your vertigo get better faster. This is called the Epley maneuver. Your doctor may also prescribe medicines or exercises to help with your symptoms. Follow-up care is a key part of your treatment and safety. Be sure to make and go to all appointments, and call your doctor if you are having problems. It's also a good idea to know your test results and keep a list of the medicines you take. How can you care for yourself at home? · If your doctor suggests that you do Desouza-Daroff exercises:  ? Sit on the edge of a bed or sofa. Quickly lie down on the side that causes the worst vertigo. Lie on your side with your ear down. ? Stay in this position for at least 30 seconds or until the vertigo goes away. ? Sit up. If this causes vertigo, wait for it to stop. ? Repeat the procedure on the other side. ? Repeat this 10 times. Do these exercises 2 times a day until the vertigo is gone. When should you call for help? Call 911 anytime you think you may need emergency care. For example, call if:    · You have symptoms of a stroke.  These may include:  ? Sudden numbness, tingling, weakness, or loss of movement in your face, arm, or leg, especially on only one side of your body. ? Sudden vision changes. ? Sudden trouble speaking. ? Sudden confusion or trouble understanding simple statements. ? Sudden problems with walking or balance. ? A sudden, severe headache that is different from past headaches. Call your doctor now or seek immediate medical care if:    · You have new or worse nausea and vomiting.     · You have new symptoms such as hearing loss or roaring in your ears. Watch closely for changes in your health, and be sure to contact your doctor if:    · You are not getting better as expected.     · Your vertigo gets worse. Where can you learn more? Go to http://www.gray.com/  Enter P372 in the search box to learn more about \"Benign Paroxysmal Positional Vertigo (BPPV): Care Instructions. \"  Current as of: September 8, 2021               Content Version: 13.2  © 3749-1239 [employer]. Care instructions adapted under license by [employer] (which disclaims liability or warranty for this information). If you have questions about a medical condition or this instruction, always ask your healthcare professional. [address] any warranty or liability for your use of this information. Vertigo: Exercises  Introduction  Here are some examples of exercises for you to try. The exercises may be suggested for a condition or for rehabilitation. Start each exercise slowly. Ease off the exercises if you start to have pain. You will be told when to start these exercises and which ones will work best for you. How to do the exercises  Exercise 1    1. Stand with a chair in front of you and a wall behind you. If you begin to fall, you may use them for support. 2. Stand with your feet together and your arms at your sides. 3. Move your head up and down 10 times. Exercise 2    1. Move your head side to side 10 times. Exercise 3    1.  Move your head diagonally up and down 10 times. Exercise 4    1. Move your head diagonally up and down 10 times on the other side. Follow-up care is a key part of your treatment and safety. Be sure to make and go to all appointments, and call your doctor if you are having problems. It's also a good idea to know your test results and keep a list of the medicines you take. Where can you learn more? Go to http://www.gray.com/  Enter F349 in the search box to learn more about \"Vertigo: Exercises. \"  Current as of: September 8, 2021               Content Version: 13.2  © 2006-2022 [employer]. Care instructions adapted under license by [employer] (which disclaims liability or warranty for this information). If you have questions about a medical condition or this instruction, always ask your healthcare professional. [place] [address] any warranty or liability for your use of this information. Epley Maneuver at Home for Vertigo: Exercises  Introduction  Vertigo is a spinning or whirling sensation when you move your head. Your doctor may have moved you in different positions to help your vertigo get better faster. This is called the Epley maneuver. Your doctor also may have asked you to do these exercises at home. Do the exercises as often as your doctor recommends. If your vertigo is getting worse, your doctor may have you change the exercise or stop it. Step 1  Step 1    4. Sit on the edge of a bed or sofa. Step 2    2. Turn your head 45 degrees in the direction your doctor told you to. This should be toward the ear that causes the most vertigo for you. In this picture, the woman is turning toward her left ear. Step 3    2. Tilt yourself backward until you are lying on your back. Your head should still be at a 45-degree turn. Your head should be about midway between looking straight ahead and looking out to your side. Hold for 30 seconds.  If you have vertigo, stay in this position until it stops. Step 4    2. Turn your head 90 degrees toward the ear that has the least vertigo. In this picture, the woman is turning to the right because she has vertigo on her left side. The point of your chin should be raised and over your shoulder. Hold for 30 seconds. Step 5    1. Roll onto the side with the least vertigo. You should now be looking at the floor. Hold for 30 seconds. Follow-up care is a key part of your treatment and safety. Be sure to make and go to all appointments, and call your doctor if you are having problems. It's also a good idea to know your test results and keep a list of the medicines you take. Where can you learn more? Go to http://www.gray.com/  Enter P834 in the search box to learn more about \"Epley Maneuver at Home for Vertigo: Exercises. \"  Current as of: December 13, 2021               Content Version: 13.2  © 2006-2022 [employer]. Care instructions adapted under license by [employer] (which disclaims liability or warranty for this information). If you have questions about a medical condition or this instruction, always ask your healthcare professional. [place] [address] any warranty or liability for your use of this information.

## 2022-04-19 NOTE — PROGRESS NOTES
[address] [facility],5Th [address]  [address] [place] [address]   [address] [place] [address] [place] [address] Mary Gonzales    [phone] Office   [phone] Fax           Date:  22     Name:  Divine Figueroa  :  [id number]  MRN:  [id number]     PCP:  Aliyah Salas DO    Chief Complaint   Patient presents with    Results     HISTORY OF PRESENT ILLNESS: This is a nice 26-year-old, right-handed woman who presents today for follow-up evaluation of dizziness and vertigo following completion of her testing. At this point, she continues to have some intermittent vertigo. This seems to occur with specific head movements. This has improved since her last office visit. Balance has also improved. For further evaluation of any possible brainstem or cerebellar stroke that may explain her symptoms, she was sent for MRI and MRA of the brain neither of which demonstrated any acute abnormality or stenosis. Recap from LOV:  Neurological examination reveals antalgic gait that is wide-based with mild truncal instability and problems with tandem walking. History and abrupt onset after surgery is concerning for stroke as well as significant posterior circulation (basilar and vertebral artery) stenosis. Need to also rule out carotid artery stenosis. MRA of the head and neck was ordered to further assess. Brain MRI was ordered to assess for possible brainstem or cerebellar stroke. Patient with chronic issues of dizziness and vertigo. Previously seen by ENT and has had vestibular and balance therapy. Still having recurrence. Prior brain MRI with and without contrast done 2021 revealed mild to moderate periventricular and deep white matter disease consistent with chronic microvascular ischemic changes. Head and neck MRA and brain MRI was ordered as above.     Patient having issues with wobbliness and gait instability worse when having episodes of dizziness and vertigo. Again consider cerebellar or brainstem stroke. Neuroimaging as above. Patient on past medical history is a history of stroke. Prior brain MRI done 4/7/2021 did not reveal any old strokes. Mild to moderate periventricular and deep white matter disease consistent with chronic microvascular ischemic changes. However abrupt onset of dizziness/vertigo and gait instability is concerning for brainstem or cerebellar stroke. Brain MRI was ordered as above. Further intervention be done pending results of testing. I need to obtain a copy of her medical records from her PCP. Continue aspirin 325 mg daily for stroke prophylaxis. Call 911 if new deficits occur. All questions and concerns were answered. EXAM:  MRI BRAIN WO CONT, MRA NECK WO CONT, MRA BRAIN WO CONT (03/17/2022)     INDICATION:    persistent issues with dizziness, vertigo, gait instability and  feeling of passing out. Assess for brainstem or cerebellar stroke     COMPARISON:  MRI brain 4/7/2021.     CONTRAST: None.     TECHNIQUE:    MRI Brain:  Multiplanar multisequence acquisition without contrast of the brain.     MRA Head/Neck:  Time-of-flight non-contrast MRA of the head and neck were performed. Multiplanar  and MIP reconstructions were obtained.     FINDINGS:  MRI Brain:  The ventricles are normal in size and position. Unchanged scattered  periventricular and deep white matter T2/FLAIR hyperintensities, confluent in  regions, consistent with mild to moderate chronic microvascular ischemic  disease. There is no acute infarct, hemorrhage, extra-axial fluid collection, or  mass effect. There is no cerebellar tonsillar herniation. Expected arterial  flow-voids are present.     Mild mucosal thickening in the right maxillary sinus. The remaining paranasal  sinuses, mastoid air cells and middle ears are clear. The orbital contents are  within normal limits.  No significant osseous or scalp lesions are identified.     MRA Head:  There is no evidence of large vessel occlusion or flow-limiting stenosis of the  intracranial internal carotid, anterior cerebral, and middle cerebral arteries. The anterior communicating artery is diminutive but patent.      There is no evidence of large vessel occlusion or flow-limiting stenosis of the  intracranial vertebral arteries, basilar artery, or posterior cerebral arteries. Fetal origin of the right posterior cerebral artery.      There is no evidence of aneurysm or vascular malformation.     MRA Neck:  The common carotid arteries demonstrate no flow-limiting stenosis. There is no  evidence of flow-limiting stenosis in the cervical right internal carotid  artery. There is no evidence of flow-limiting stenosis in the cervical left  internal carotid artery.     There is a codominant vertebrobasilar arterial system. The vertebral arteries  and imaged portion of the basilar artery demonstrate no flow-limiting stenosis.      IMPRESSION  MRI Brain:  1. No acute intracranial abnormality. 2. Unchanged mild to moderate chronic microvascular ischemic disease.     MRA Head:  1. No evidence of significant stenosis or aneurysm.     MRA Neck:  1. No evidence of significant stenosis.        Current Outpatient Medications   Medication Sig    carvediloL (COREG) 6.25 mg tablet Take 1 Tablet by mouth two (2) times daily (with meals).  albuterol (PROVENTIL HFA, VENTOLIN HFA, PROAIR HFA) 90 mcg/actuation inhaler INHALE 2 PUFFS INTO THE LUNGS EVERY 4 HOURS AS NEEDED FOR 30 DAYS    ascorbic acid (VITAMIN C PO) Take  by mouth daily.  elderberry fruit (ELDERBERRY PO) Take  by mouth daily.  ALPRAZolam (XANAX) 0.5 mg tablet TAKE 1 TABLET BY MOUTH ONCE DAILY AS NEEDED    Multivitamins with Fluoride (MULTI-VITAMIN PO) Take  by mouth daily.  omeprazole (PRILOSEC) 40 mg capsule Take 40 mg by mouth daily.  pravastatin (PRAVACHOL) 40 mg tablet Take 40 mg by mouth nightly.  1/2 tablet    aspirin (ASPIRIN) 325 mg tablet Take 325 mg by mouth daily.  vitamin E (AQUA GEMS) 400 unit capsule Take  by mouth daily.  cholecalciferol, vitamin D3, (VITAMIN D3) 2,000 unit tab Take  by mouth daily. No current facility-administered medications for this visit. Allergies   Allergen Reactions    Latex Hives    Morphine Anaphylaxis    Ciprofloxacin Unknown (comments)    Hydrocodone Unknown (comments)    Levaquin [Levofloxacin] Unknown (comments)    Other Medication Other (comments)     Per pt- No \"nuclear\" medicine     Pcn [Penicillins] Unknown (comments)    Shellfish Derived Unknown (comments)    Sulfa (Sulfonamide Antibiotics) Unknown (comments)     Past Medical History:   Diagnosis Date    Arthritis     Asthma     CAD (coronary artery disease)     MI 5/2017    Diverticula of colon     Gastrointestinal disorder     Hypertension     Neurological disorder     CVA     Stroke [facility])      Past Surgical History:   Procedure Laterality Date    HX APPENDECTOMY      HX CHOLECYSTECTOMY      HX GYN      hysterectomy    HX ORTHOPAEDIC      left knee replacement     Social History     Socioeconomic History    Marital status:      Spouse name: Not on file    Number of children: Not on file    Years of education: Not on file    Highest education level: Not on file   Occupational History    Not on file   Tobacco Use    Smoking status: Former Smoker    Smokeless tobacco: Never Used   Substance and Sexual Activity    Alcohol use: No    Drug use: Not on file    Sexual activity: Not on file   Other Topics Concern    Not on file   Social History Narrative    Not on file     Social Determinants of Health     Financial Resource Strain:     Difficulty of Paying Living Expenses: Not on file   Food Insecurity:     Worried About Running Out of Food in the Last Year: Not on file    [place] of Food in the Last Year: Not on file   Transportation Needs:     Lack of Transportation (Medical):  Not on file    Lack of Transportation (Non-Medical): Not on file   Physical Activity:     Days of Exercise per Week: Not on file    Minutes of Exercise per Session: Not on file   Stress:     Feeling of Stress : Not on file   Social Connections:     Frequency of Communication with Friends and Family: Not on file    Frequency of Social Gatherings with Friends and Family: Not on file    Attends Hinduism Services: Not on file    Active Member of [address] [place] [address] or Organizations: Not on file    Attends Club or Organization Meetings: Not on file    Marital Status: Not on file   Intimate Partner Violence:     Fear of Current or Ex-Partner: Not on file    Emotionally Abused: Not on file    Physically Abused: Not on file    Sexually Abused: Not on file   Housing Stability:     Unable to Pay for Housing in the Last Year: Not on file    Number of [place] in the Last Year: Not on file    Unstable Housing in the Last Year: Not on file     Family History   Problem Relation Age of Onset    Stroke Mother     Coronary Art Dis Father        PHYSICAL EXAMINATION:    Visit Vitals  /84 (BP 1 Location: Left arm, BP Patient Position: Sitting, BP Cuff Size: Adult)   Pulse 68   Resp 17   SpO2 96%     General:  Well defined, nourished, and groomed individual in no acute distress. Neck: Supple, nontender, no bruits, no pain with resistance to active range of motion. Heart: Regular rate and rhythm, no murmurs, rub, or gallop. Normal S1S2. Lungs:  Clear to auscultation bilaterally with equal chest expansion, no cough, no wheeze  Musculoskeletal:  Extremities revealed no edema and had full range of motion of joints. Psych:  Good mood and bright affect    NEUROLOGICAL EXAMINATION:     Mental Status:   Alert and oriented to person, place, and time with recent and remote memory intact. Attention span and concentration are normal. Speech is fluent with a full fund of knowledge.       Cranial Nerves:  I: smell Not tested   II: visual fields Full to confrontation   II: pupils Equal, round, reactive to light   II: optic disc No papilledema   III,VII: ptosis none   III,IV,VI: extraocular muscles  Full ROM   V: mastication normal   V: facial light touch sensation  normal   VII: facial muscle function   symmetric   VIII: hearing symmetric   IX: soft palate elevation  normal   XI: trapezius strength  5/5   XI: sternocleidomastoid strength 5/5   XI: neck flexion strength  5/5   XII: tongue  midline     Motor Examination: Normal tone, bulk, and strength, 5/5 muscle strength throughout. Coordination:  Finger to nose and rapid arm movement testing was normal.   No resting or intention tremor    Gait and Station:  Steady while walking on toes, heels, and with tandem walking. Normal arm swing. No pronator drift. No muscle wasting or fasiculations noted. Reflexes:  DTRs 2+ throughout. ASSESSMENT AND PLAN    ICD-10-CM ICD-9-CM    1. Benign paroxysmal positional vertigo of left ear  H81.12 386.11      Episodic vertigo with a positive Rima-Hallpike particularly with head turning to the left suggestive of a benign paroxysmal positional vertigo of the left ear. This in general has improved. Suggested participating potentially in vestibular therapy versus home exercises which she has done in the past.  Written instructions for home exercises were provided. There is no evidence of cerebellar or brainstem infarct on MRI or MRA. As long as this improves, she can be seen in neurology as needed. Yanely Valadez Appl

## 2022-05-26 LAB — SARS-COV-2: NEGATIVE

## 2022-08-03 ENCOUNTER — ANESTHESIA (OUTPATIENT)
Dept: ENDOSCOPY | Age: 72
End: 2022-08-03
Payer: MEDICARE

## 2022-08-03 ENCOUNTER — ANESTHESIA EVENT (OUTPATIENT)
Dept: ENDOSCOPY | Age: 72
End: 2022-08-03
Payer: MEDICARE

## 2022-08-03 ENCOUNTER — HOSPITAL ENCOUNTER (OUTPATIENT)
Age: 72
Setting detail: OUTPATIENT SURGERY
Discharge: HOME | End: 2022-08-03
Attending: SPECIALIST | Admitting: SPECIALIST
Payer: MEDICARE

## 2022-08-03 VITALS
RESPIRATION RATE: 17 BRPM | WEIGHT: 145.28 LBS | HEIGHT: 63 IN | DIASTOLIC BLOOD PRESSURE: 59 MMHG | OXYGEN SATURATION: 96 % | TEMPERATURE: 98.2 F | BODY MASS INDEX: 25.74 KG/M2 | SYSTOLIC BLOOD PRESSURE: 125 MMHG | HEART RATE: 58 BPM

## 2022-08-03 PROCEDURE — 74011250636 HC RX REV CODE- 250/636: Performed by: NURSE ANESTHETIST, CERTIFIED REGISTERED

## 2022-08-03 PROCEDURE — 76060000032 HC ANESTHESIA 0.5 TO 1 HR: Performed by: SPECIALIST

## 2022-08-03 PROCEDURE — 77030021593 HC FCPS BIOP ENDOSC BSC -A: Performed by: SPECIALIST

## 2022-08-03 PROCEDURE — 88305 TISSUE EXAM BY PATHOLOGIST: CPT

## 2022-08-03 PROCEDURE — 74011000250 HC RX REV CODE- 250: Performed by: NURSE ANESTHETIST, CERTIFIED REGISTERED

## 2022-08-03 PROCEDURE — 76040000007: Performed by: SPECIALIST

## 2022-08-03 PROCEDURE — 2709999900 HC NON-CHARGEABLE SUPPLY: Performed by: SPECIALIST

## 2022-08-03 PROCEDURE — 88342 IMHCHEM/IMCYTCHM 1ST ANTB: CPT

## 2022-08-03 RX ORDER — FLUMAZENIL 0.1 MG/ML
0.2 INJECTION INTRAVENOUS
Status: DISCONTINUED | OUTPATIENT
Start: 2022-08-03 | End: 2022-08-03 | Stop reason: HOSPADM

## 2022-08-03 RX ORDER — DILTIAZEM HYDROCHLORIDE 60 MG/1
60 TABLET, FILM COATED ORAL 2 TIMES DAILY
COMMUNITY

## 2022-08-03 RX ORDER — NALOXONE HYDROCHLORIDE 0.4 MG/ML
0.4 INJECTION, SOLUTION INTRAMUSCULAR; INTRAVENOUS; SUBCUTANEOUS
Status: DISCONTINUED | OUTPATIENT
Start: 2022-08-03 | End: 2022-08-03 | Stop reason: HOSPADM

## 2022-08-03 RX ORDER — SODIUM CHLORIDE 9 MG/ML
50 INJECTION, SOLUTION INTRAVENOUS CONTINUOUS
Status: DISCONTINUED | OUTPATIENT
Start: 2022-08-03 | End: 2022-08-03 | Stop reason: HOSPADM

## 2022-08-03 RX ORDER — ACETAMINOPHEN 500 MG
TABLET ORAL
COMMUNITY

## 2022-08-03 RX ORDER — DEXTROMETHORPHAN/PSEUDOEPHED 2.5-7.5/.8
1.2 DROPS ORAL
Status: DISCONTINUED | OUTPATIENT
Start: 2022-08-03 | End: 2022-08-03 | Stop reason: HOSPADM

## 2022-08-03 RX ORDER — PROPOFOL 10 MG/ML
INJECTION, EMULSION INTRAVENOUS
Status: DISCONTINUED | OUTPATIENT
Start: 2022-08-03 | End: 2022-08-03 | Stop reason: HOSPADM

## 2022-08-03 RX ORDER — PROPOFOL 10 MG/ML
INJECTION, EMULSION INTRAVENOUS AS NEEDED
Status: DISCONTINUED | OUTPATIENT
Start: 2022-08-03 | End: 2022-08-03 | Stop reason: HOSPADM

## 2022-08-03 RX ORDER — MIDAZOLAM HYDROCHLORIDE 1 MG/ML
.25-5 INJECTION, SOLUTION INTRAMUSCULAR; INTRAVENOUS AS NEEDED
Status: DISCONTINUED | OUTPATIENT
Start: 2022-08-03 | End: 2022-08-03 | Stop reason: HOSPADM

## 2022-08-03 RX ORDER — LIDOCAINE HYDROCHLORIDE 20 MG/ML
INJECTION, SOLUTION EPIDURAL; INFILTRATION; INTRACAUDAL; PERINEURAL AS NEEDED
Status: DISCONTINUED | OUTPATIENT
Start: 2022-08-03 | End: 2022-08-03 | Stop reason: HOSPADM

## 2022-08-03 RX ORDER — FENTANYL CITRATE 50 UG/ML
25 INJECTION, SOLUTION INTRAMUSCULAR; INTRAVENOUS AS NEEDED
Status: DISCONTINUED | OUTPATIENT
Start: 2022-08-03 | End: 2022-08-03 | Stop reason: HOSPADM

## 2022-08-03 RX ADMIN — PROPOFOL 20 MG: 10 INJECTION, EMULSION INTRAVENOUS at 10:44

## 2022-08-03 RX ADMIN — LIDOCAINE HYDROCHLORIDE 70 MG: 20 INJECTION, SOLUTION EPIDURAL; INFILTRATION; INTRACAUDAL; PERINEURAL at 10:38

## 2022-08-03 RX ADMIN — PROPOFOL 20 MG: 10 INJECTION, EMULSION INTRAVENOUS at 10:47

## 2022-08-03 RX ADMIN — PROPOFOL 125 MCG/KG/MIN: 10 INJECTION, EMULSION INTRAVENOUS at 10:48

## 2022-08-03 RX ADMIN — PROPOFOL 80 MG: 10 INJECTION, EMULSION INTRAVENOUS at 10:41

## 2022-08-03 NOTE — PROCEDURES
[facility] RIA Sinha MD  [phone]      August 3, 2022    Esophagogastroduodenoscopy & Colonoscopy Procedure Note  Kunal Mills  :   [address] [place] [address] Medical Record Number: [id number]      Indications:    Vomiting, dysphagia (status post repair of hiatus hernia)  Diarrhea   Referring Physician:  Thierry Salinas DO  Anesthesia/Sedation: Conscious Sedation/Moderate Sedation/MAC  Endoscopist:  Dr. Fabio Vargas  Complications:  None  Estimated Blood Loss:  None    Permit:  The indications, risks, benefits and alternatives were reviewed with the patient or their decision maker who was provided an opportunity to ask questions and all questions were answered. The specific risks of esophagogastroduodenoscopy with conscious sedation were reviewed, including but not limited to anesthetic complication, bleeding, adverse drug reaction, missed lesion, infection, IV site reactions, and intestinal perforation which would lead to the need for surgical repair. Alternatives to EGD and colonoscopy including radiographic imaging, observation without testing, or laboratory testing were reviewed as well as the limitations of those alternatives discussed. After considering the options and having all their questions answered, the patient or their decision maker provided both verbal and written consent to proceed. -----------EGD------------   Procedure in Detail:  After obtaining informed consent, positioning of the patient in the left lateral decubitus position, and conduction of a pre-procedure pause or \"time out\" the endoscope was introduced into the mouth and advanced to the duodenum. A careful inspection was made, and findings or interventions are described below. Findings:   Esophagus:normal - no hiatus hernia or mucosal pathology.   Cold forceps biopsies of the mid esophagus taken for histology. Stomach: Normal stomach, in both forward and retroflexed views, cold forceps biopsies taken for histology. Duodenum/jejunum: normal, cold forceps biopsies of the duodenum taken for histology. ----------Colonoscopy-----------    Procedure in Detail:  After obtaining informed consent, positioning of the patient in the left lateral decubitus position, and conduction of a pre-procedure pause or \"time out\" the endoscope was introduced into the anus and advanced to the cecum, which was identified by the ileocecal valve and appendiceal orifice. The quality of the colonic preparation was good. A careful inspection was made as the colonoscope was withdrawn, findings and interventions are described below. Findings:   Difficult colonoscopy due to a sense of pelvic adhesions and a narrow rectosigmoid angle. Intubation of the cecum obtained with gentle advancement and pressure from the assistant. There is diverticulosis in the sigmoid colon without complications such as bleeding, inflammatory change, or luminal narrowing. Normal mucosa throughout the colon, random colon biopsies obtained for histology. ------------------------------  Specimens:    See above    Complications:   None; patient tolerated the procedure well. Impressions:  EGD:  Normal EGD without evidence of recurrent hernia. Colonoscopy: Normal colonoscopy to the cecum, with no evidence of neoplasia, diverticular disease, or mucosal abnormality. Recommendations:     - Await pathology. Thank you for entrusting me with this patient's care. Please do not hesitate to contact me with any questions or if I can be of assistance with any of your other patients' GI needs. Signed By: Jessica Barron MD                        August 3, 2022    Surgical assistant none. Implants none unless specified.

## 2022-08-03 NOTE — INTERVAL H&P NOTE
Pre-Endoscopy H&P Update  Chief complaint/HPI/ROS:  The indication for the procedure, the patient's history and the patient's current medications are reviewed prior to the procedure and that data is reported on the H&P to which this document is attached. Any significant complaints with regard to organ systems will be noted, and if not mentioned then a review of systems is not contributory. Past Medical History:   Diagnosis Date    Arthritis     Asthma     CAD (coronary artery disease)     MI 5/2017    Diverticula of colon     Gastrointestinal disorder     Hypertension     Neurological disorder     CVA     Stroke [facility])       Past Surgical History:   Procedure Laterality Date    HX APPENDECTOMY      HX CHOLECYSTECTOMY      HX GYN      hysterectomy    HX HERNIA REPAIR  11/10/2021    Hiatial Hernia By Dr Ann Zee      left knee replacement     Social   Social History     Tobacco Use    Smoking status: Former    Smokeless tobacco: Never   Substance Use Topics    Alcohol use: No      Family History   Problem Relation Age of Onset    Stroke Mother     Coronary Art Dis Father       Allergies   Allergen Reactions    Latex Hives    Morphine Anaphylaxis    Lidocaine Other (comments)     Chest pain    Ciprofloxacin Unknown (comments)    Hydrocodone Unknown (comments)    Levaquin [Levofloxacin] Unknown (comments)    Other Medication Other (comments)     Per pt- No \"nuclear\" medicine     Pcn [Penicillins] Unknown (comments)    Shellfish Derived Unknown (comments)    Sulfa (Sulfonamide Antibiotics) Unknown (comments)      Prior to Admission Medications   Prescriptions Last Dose Informant Patient Reported? Taking? ALPRAZolam (XANAX) 0.5 mg tablet 8/2/2022  Yes Yes   Sig: TAKE 1 TABLET BY MOUTH ONCE DAILY AS NEEDED   Lactobacillus acidophilus (Probiotic) 10 billion cell cap   Yes Yes   Sig: Take  by mouth. Multivitamins with Fluoride (MULTI-VITAMIN PO)   Yes No   Sig: Take  by mouth daily.    albuterol (PROVENTIL HFA, VENTOLIN HFA, PROAIR HFA) 90 mcg/actuation inhaler 8/3/2022 at 820am  Yes Yes   Sig: INHALE 2 PUFFS INTO THE LUNGS EVERY 4 HOURS AS NEEDED FOR 30 DAYS   ascorbic acid (VITAMIN C PO) 8/1/2022  Yes No   Sig: Take  by mouth daily. aspirin (ASPIRIN) 325 mg tablet 8/2/2022  Yes Yes   Sig: Take 325 mg by mouth daily. carvediloL (COREG) 6.25 mg tablet 8/3/2022  No Yes   Sig: Take 1 Tablet by mouth two (2) times daily (with meals). cholecalciferol, vitamin D3, 50 mcg (2,000 unit) tab 8/1/2022  Yes No   Sig: Take  by mouth daily. dilTIAZem IR (CARDIZEM) 60 mg tablet 8/3/2022  Yes Yes   Sig: Take 60 mg by mouth two (2) times a day. elderberry fruit (ELDERBERRY PO) 8/1/2022  Yes Yes   Sig: Take  by mouth daily. omeprazole (PRILOSEC) 40 mg capsule 8/2/2022  Yes Yes   Sig: Take 40 mg by mouth daily. pravastatin (PRAVACHOL) 40 mg tablet 8/1/2022  Yes No   Sig: Take 40 mg by mouth nightly. 1/2 tablet   vitamin E (AQUA GEMS) 400 unit capsule 8/1/2022  Yes No   Sig: Take  by mouth daily. Facility-Administered Medications: None       PHYSICAL EXAM:  The patient is examined immediately prior to the procedure. Visit Vitals  BP (!) 143/57   Pulse 66   Temp 98.2 °F (36.8 °C)   Resp 18   Ht 5' 3\" (1.6 m)   Wt 65.9 kg (145 lb 4.5 oz)   SpO2 98%   BMI 25.74 kg/m²     Gen: Appears comfortable, no distress. Pulm: comfortable respirations with no abnormal audible breath sounds  HEART: well perfused, no abnormal audible heart sounds  GI: abdomen flat. PLAN:  Informed consent discussion held, patient afforded an opportunity to ask questions and all questions answered. After being advised of the risks, benefits, and alternatives, the patient requested that we proceed and indicated so on a written consent form. Will proceed with procedure as planned.   Mala Rascon MD

## 2022-08-03 NOTE — PROGRESS NOTES
Endoscopy discharge instructions have been reviewed and given to patient. The patient verbalized understanding and acceptance of instructions. Dr. Daniel Rod  discussed with spouse procedure findings and next steps.

## 2022-08-03 NOTE — H&P
Date: 2022 1:00 PM  Patient Name: Lady Cleve Burr  Account #: [id number]  Gender: Female   (age): 1950 (74)  Provider:    Diamond Haynes. Isaias Habermann     Referring Physician:    [place] [id number], [place], [address]  [phone] (phone)  [phone] (fax)    Shayy Lawton [facility] [address], [address]  [phone] (phone)  [phone] (fax)     Chief Complaint:    diarrhea     History of Present Illness:  66yo female is seen for complaints of diarrhea. On 2021 she had a laparoscopic paraesophageal hernia repair performed by Dr. Robin Steen. She states that after the surgery she had constipation. However, once she started to have bowel movements it's been ongoing diarrhea. Every other day she would have about 3-4 bowel movements per day. Stool consistency started formed and then became watery. Not associated with fever, nausea/vomiting, or unintentional weight loss.v Reports the weight loss was due to change in diet after surgery. Two weeks ago she started taking a generic probiotic. With the addition of the probiotic she had significant improvement. Diarrhea has resolved. Averaging 1-2 bowel movements per day. Formed stool. She is happy with her progress. States that she is gaining weight as she can eat. Also noted GERD symptoms have resolved since surgery. Last colonoscopy was <10 years ago (done at [facility]).        2022 labs: TSH normal; CBC, CMP, and lipid panel unremarkable  2022 - C diff negative, stool culture negative, IFOB negative     Past Medical History  Medical Conditions:   Arthritis  Asthma  Diverticulitis  Hemorrhoids  High blood pressure  High cholesterol  Ischemic Heart Disease  Stroke/ CVA  Surgical Procedures:   L TKA  Gallbladder surgery  Hysterectomy  Dx Studies:   Abdominal U/S  Colonoscopy  Endoscopy  Medications:   alprazolam 0.5 mg Take 1 tablet by mouth twice a day  carvedilol 6.25 mg Take 1/2 tablet by mouth twice a day  diazepam 5 mg Take 1 tablet by mouth once a day  diltiazem HCl 60 mg Take 1 tablet by mouth once a day  omeprazole 40 mg Take 1 capsule by mouth twice a day  pravastatin 20 mg Take 1 tablet by mouth once a day  ProAir HFA 90 mcg/actuation Inhale 2 puffs by mouth as needed  Allergies:   Iodine And Iodide Containing Products  Latex, Natural Rubber  morphine  Immunizations:   COVID Vaccine (refused)  Influenza vaccination (refused)  Social History  Alcohol:   None  Tobacco:   Former smoker  Drugs:   None  Exercise:   Exercise 3 or more times a week. Caffeine:   Daily. Marital Status:    Unknown     Family History   No Knowledge Of Family History  Review of Systems:  Cardiovascular: Denies chest pain, irregular heart beat, palpitations, peripheral edema, syncope, Sweats. Constitutional: Denies fatigue, fever, loss of appetite, weight gain, weight loss. ENMT: Denies nose bleeds, sore throat, hearing loss. Endocrine: Denies excessive thirst, heat intolerance. Eyes: Denies loss of vision. Gastrointestinal: Presents suffers from abdominal pain, diarrhea, Bloating/gas. Denies abdominal swelling, change in bowel habits, constipation, heartburn, jaundice, nausea, rectal bleeding, stomach cramps, vomiting, dysphagia, rectal pain, Stool incontinence, hematemesis. Genitourinary: Denies dark urine, dysuria, frequent urination, hematuria, incontinence. Hematologic/Lymphatic: Denies easy bruising, prolonged bleeding. Integumentary: Denies itching, rashes, sun sensitivity. Musculoskeletal: Denies arthritis, back pain, gout, joint pain, muscle weakness, stiffness. Neurological: Denies dizziness, fainting, frequent headaches, memory loss. Psychiatric: Denies anxiety, depression, difficulty sleeping, hallucinations, nervousness, panic attacks, paranoia. Respiratory: Denies cough, dyspnea, wheezing.   Vital Signs:  BP  (mmHg)  Pulse  (bpm) Rhythm Weight (lbs/oz) Height (ft/in) BMI Temp  150/81 69 Regular 144 / 4 5 / 3 25.55 97.7 (F)  Physical Exam:  Constitutional:  Appearance: No distress, appears comfortable. Communication: Understands/receives spoken information. Respiratory:  Effort: Normal respiratory effort, comfortable, speaks in complete sentences. Auscultation: normal breath sounds, no rubs, wheezes or rhonchi. Cardiovascular: Auscultation: regular rate and rhythm,. Gastrointestinal/Abdomen:  Abdomen: well healed surgical incisions. bowel sounds active, soft, nondistended. .  Musculoskeletal:  Back: kyphosis. Psychiatric:  Judgment/insight: Normal,normal judgement, normal insight. Orientation: oriented to time, space and person. Memory: normal short term memory, normal long term memory, no memory loss. Mood and affect: Normal mood, affect full,no evidence of depression, anxiety or agitation. Lab Results:   No Electronic Results  Impressions: Altered bowel function - resolved  Assessment:   66yo female with altered bowel function after laparoscopic paraesophageal hernia repair. With starting a probiotic 2 weeks ago, shes noticed improvement with her symptoms. Will obtain previous colonoscopy report to determine if due for CRC screening. Plan:   Education handout on Hypertension  Education handout on BMI Healthy Weight    Daily Probiotic    Please obtain colonoscopy report from [facility] (patient could not recall year but states <10 years ago)  Risk & Medical Necessity:    The level of medical decision making for this visit is low. The number and complexity of problems addressed are low. The risk of complications and/or morbidity or mortality of patient management is low. Latonia Falk. Sarwat Nikolay    Electronically signed on 2022 1:34:52 PM by Latonia Falk. Janet Saint Christean Balint, MRN [id number],  3/02/7013 IPP First Visit, Thursday, May 19, 2022

## 2022-08-03 NOTE — ANESTHESIA POSTPROCEDURE EVALUATION
Procedure(s):  COLONOSCOPY  ESOPHAGOGASTRODUODENOSCOPY (EGD)  ESOPHAGOGASTRODUODENAL (EGD) BIOPSY  COLON BIOPSY. MAC    Anesthesia Post Evaluation      Multimodal analgesia: multimodal analgesia not used between 6 hours prior to anesthesia start to PACU discharge  Patient location during evaluation: PACU  Patient participation: complete - patient participated  Level of consciousness: awake and alert  Pain score: 0  Pain management: satisfactory to patient  Airway patency: patent  Anesthetic complications: no  Cardiovascular status: acceptable  Respiratory status: acceptable  Hydration status: acceptable  Post anesthesia nausea and vomiting:  none  Final Post Anesthesia Temperature Assessment:  Normothermia (36.0-37.5 degrees C)      INITIAL Post-op Vital signs:   Vitals Value Taken Time   /59 08/03/22 1130   Temp 36.8 °C (98.2 °F) 08/03/22 1115   Pulse 56 08/03/22 1133   Resp 17 08/03/22 1133   SpO2 99 % 08/03/22 1133   Vitals shown include unvalidated device data.

## 2022-08-03 NOTE — PROGRESS NOTES
Corrinne Chihuahua  1950  [id number]    Situation:  Verbal report received from:   Rain Pink   Procedure: Procedure(s):  COLONOSCOPY  ESOPHAGOGASTRODUODENOSCOPY (EGD)  ESOPHAGOGASTRODUODENAL (EGD) BIOPSY  COLON BIOPSY    Background:    Preoperative diagnosis: Altered bowel function [R19.8]  Paraesophageal hernia [K44.9]  Acute bilateral upper abdominal pain [R10.11, R10.12]  Postoperative diagnosis: Normal EGD  Diverticulosis    :  Dr. Kenneth Alston   Assistant(s): Endoscopy Technician-1: Cindy De Paz  Endoscopy RN-1: Stacia Jacobs RN    Specimens:   ID Type Source Tests Collected by Time Destination   1 : duodenum bx Preservative Duodenum  Garcia MD [facility] 8/3/2022 1043 Pathology   2 : Antrum bx Preservative Gastric  Garcia MD Charles 8/3/2022 1044 Pathology   3 : Mid esophagus bx Preservative Esophagus, Mid  Garcia MD Charles 8/3/2022 1046 Pathology   4 : Random colon bx Preservative Random colon  Garcia MD Charles 8/3/2022 1059 Pathology     H. Pylori  no    Assessment:  Intra-procedure medications     Anesthesia gave intra-procedure sedation and medications, see anesthesia flow sheet yes    Intravenous fluids: NS@ KVO     Vital signs stable yes     Abdominal assessment: round and soft   yes    Recommendation:  Discharge patient per MD order  yes.   Return to floor  outpatient   Family or Friend   spouse  Permission to share finding with family or friend yes

## 2022-08-03 NOTE — DISCHARGE INSTRUCTIONS
[address] [facility] RIA Olivarez MD  [phone]      August 3, 2022    [address] [place] [address] [place]: 5/70/7128    COLONOSCOPY DISCHARGE INSTRUCTIONS    If there is redness at IV site you should apply warm compress to area. If redness or soreness persist contact Dr. Beni Olivarez' or your primary care doctor. There may be a slight amount of blood passed from the rectum. Gaseous discomfort may develop, but walking, belching will help relieve this. You may not operate a vehicle for 12 hours  You may not operate machinery or dangerous appliances for rest of today  You may not drink alcoholic beverages for 12 hours  Avoid making any critical decisions for 24 hours    DIET:  You may resume your normal diet, but some patients find that heavy or large meals may lead to indigestion or vomiting. I suggest a light meal as first food intake. MEDICATIONS:  The use of some over-the-counter pain medication may lead to bleeding after colon biopsies or polyp removal.  Tylenol (also called acetaminophen) is safe to take even if you have had colonoscopy with polyp removal.  Based on the procedure you had today you may not safely take aspirin or aspirin-like products for the next ten (10) days. Remember that Tylenol (also called acetaminophen) is safe to take after colonoscopy even if you have had biopsies or polyps removed. ACTIVITY:  You may resume your normal household activities, but it is recommended that you spend the remainder of the day resting -  avoid any strenuous activity. CALL DR. Ana Maria Kincaid' OFFICE IF:  Increasing pain, nausea, vomiting  Abdominal distension (swelling)  Significant new or increased bleeding (oral or rectal)  Fever/Chills  Chest pain/shortness of breath                       Additional instructions:   No aspirin 10 days. We found no polyps, cancers, ulcers, or any abnormalities today.   I took biopsies of what appears normal to make that the intestine is healthy even under the microscope. I'll contact you with those results in 10-14 days. It was an honor to be your doctor today. Please let me or my office staff know if you have any feedback about today's procedure. Mukul Cao MD    Colonoscopy saves lives, and can prevent colon cancer. Everyone aged 48 or older needs colonoscopy.   Tell your family and friends: get the test!

## 2022-08-03 NOTE — PERIOP NOTES

## 2022-08-03 NOTE — ANESTHESIA PREPROCEDURE EVALUATION
Relevant Problems   No relevant active problems       Anesthetic History               Review of Systems / Medical History  Patient summary reviewed and pertinent labs reviewed    Pulmonary            Asthma        Neuro/Psych         TIA  Pertinent negatives: No CVA   Cardiovascular    Hypertension          CAD    Exercise tolerance: >4 METS     GI/Hepatic/Renal  Within defined limits              Endo/Other        Arthritis     Other Findings              Physical Exam    Airway  Mallampati: II  TM Distance: 4 - 6 cm  Neck ROM: normal range of motion   Mouth opening: Normal     Cardiovascular    Rhythm: regular  Rate: normal         Dental  No notable dental hx       Pulmonary  Breath sounds clear to auscultation               Abdominal  GI exam deferred       Other Findings            Anesthetic Plan    ASA: 2  Anesthesia type: MAC          Induction: Intravenous  Anesthetic plan and risks discussed with: Patient      Patient only wants \"light\" sedation.

## 2022-09-18 ENCOUNTER — HOSPITAL ENCOUNTER (EMERGENCY)
Age: 72
Discharge: HOME | End: 2022-09-18
Attending: STUDENT IN AN ORGANIZED HEALTH CARE EDUCATION/TRAINING PROGRAM
Payer: MEDICARE

## 2022-09-18 VITALS
WEIGHT: 145 LBS | SYSTOLIC BLOOD PRESSURE: 112 MMHG | HEIGHT: 63 IN | DIASTOLIC BLOOD PRESSURE: 79 MMHG | HEART RATE: 81 BPM | OXYGEN SATURATION: 95 % | BODY MASS INDEX: 25.69 KG/M2 | RESPIRATION RATE: 18 BRPM | TEMPERATURE: 98.6 F

## 2022-09-18 DIAGNOSIS — R68.89 FLU-LIKE SYMPTOMS: ICD-10-CM

## 2022-09-18 DIAGNOSIS — Z20.822 EXPOSURE TO CONFIRMED CASE OF COVID-19: Primary | ICD-10-CM

## 2022-09-18 LAB
SARS-COV-2, XPLCVT: DETECTED
SOURCE, COVRS: ABNORMAL

## 2022-09-18 PROCEDURE — 99283 EMERGENCY DEPT VISIT LOW MDM: CPT

## 2022-09-18 PROCEDURE — U0005 INFEC AGEN DETEC AMPLI PROBE: HCPCS

## 2022-09-18 RX ORDER — CLINDAMYCIN HYDROCHLORIDE 300 MG/1
300 CAPSULE ORAL 3 TIMES DAILY
Qty: 21 CAPSULE | Refills: 0 | Status: SHIPPED | OUTPATIENT
Start: 2022-09-18 | End: 2022-09-18

## 2022-09-18 RX ORDER — CEFDINIR 300 MG/1
300 CAPSULE ORAL 2 TIMES DAILY
Qty: 14 CAPSULE | Refills: 0 | Status: SHIPPED | OUTPATIENT
Start: 2022-09-18 | End: 2022-09-18

## 2022-09-18 NOTE — ED TRIAGE NOTES
Pt to ED for concern for covid after son tested positive who lives with patient. Sx began yesterday to include body aches, sore throat, nausea, headaches, SOB. Pt used inhaler and tylenol PTA. Denies chest pain.

## 2022-09-18 NOTE — DISCHARGE INSTRUCTIONS
Start taking the Paxlovid today exactly as prescribed. While you are taking this medication, you should not take your prescribed pravastatin. Continue to use supportive care measures at home to help with your symptoms such as nausea medication, Tylenol and ibuprofen for fevers, body aches, and headaches, and Mucinex or Robitussin for cough.

## 2022-09-18 NOTE — ED NOTES
Patient discharged by provider - no assigned nurse during ED visit. Patient was in full understanding of discharge instructions. Patient stable and ambulatory at time.

## 2022-09-18 NOTE — ED PROVIDER NOTES
40-year-old female with a past medical history of asthma, arthritis, CAD, diverticulosis, hypertension, and cerebrovascular disease presents the ER today for evaluation of flulike symptoms, including headache, fatigue, chills, dry cough, body aches, and nausea. She states that she was recently exposed to family member that tested positive for COVID-19. Her symptoms started yesterday.        Past Medical History:   Diagnosis Date    Arthritis     Asthma     CAD (coronary artery disease)     MI 5/2017    Diverticula of colon     Gastrointestinal disorder     Hypertension     Neurological disorder     CVA     Stroke [facility])        Past Surgical History:   Procedure Laterality Date    COLONOSCOPY N/A 8/3/2022    COLONOSCOPY performed by Yesika Weber MD at [facility] ENDOSCOPY    HX APPENDECTOMY      HX CHOLECYSTECTOMY      HX GYN      hysterectomy    HX HERNIA REPAIR  11/10/2021    Hiatial Hernia By Dr Loreta Estrada      left knee replacement         Family History:   Problem Relation Age of Onset    Stroke Mother     Coronary Art Dis Father        Social History     Socioeconomic History    Marital status:      Spouse name: Not on file    Number of children: Not on file    Years of education: Not on file    Highest education level: Not on file   Occupational History    Not on file   Tobacco Use    Smoking status: Former    Smokeless tobacco: Never   Substance and Sexual Activity    Alcohol use: No    Drug use: Not on file    Sexual activity: Not on file   Other Topics Concern    Not on file   Social History Narrative    Not on file     Social Determinants of Health     Financial Resource Strain: Not on file   Food Insecurity: Not on file   Transportation Needs: Not on file   Physical Activity: Not on file   Stress: Not on file   Social Connections: Not on file   Intimate Partner Violence: Not on file   Housing Stability: Not on file         ALLERGIES: Latex, Morphine, Lidocaine, Ciprofloxacin, Hydrocodone, Levaquin [levofloxacin], Other medication, Pcn [penicillins], Shellfish derived, and Sulfa (sulfonamide antibiotics)    Review of Systems   Constitutional:  Positive for chills and fatigue. Respiratory:  Positive for cough. Gastrointestinal:  Negative for abdominal pain and vomiting. Musculoskeletal:  Positive for myalgias. Neurological:  Positive for headaches. Negative for dizziness. All other systems reviewed and are negative. Vitals:    09/18/22 0903   BP: 112/79   Pulse: 81   Resp: 18   Temp: 98.6 °F (37 °C)   SpO2: 95%   Weight: 65.8 kg (145 lb)   Height: 5' 3\" (1.6 m)            Physical Exam  Vitals and nursing note reviewed. Constitutional:       General: She is not in acute distress. Appearance: Normal appearance. She is not ill-appearing. HENT:      Head: Normocephalic and atraumatic. Right Ear: External ear normal.      Left Ear: External ear normal.      Nose: Nose normal.      Mouth/Throat:      Mouth: Mucous membranes are moist.      Pharynx: Oropharynx is clear. Eyes:      General: No scleral icterus. Extraocular Movements: Extraocular movements intact. Conjunctiva/sclera: Conjunctivae normal.      Pupils: Pupils are equal, round, and reactive to light. Cardiovascular:      Rate and Rhythm: Normal rate and regular rhythm. Pulses: Normal pulses. Heart sounds: Normal heart sounds. Pulmonary:      Effort: Pulmonary effort is normal.      Breath sounds: Normal breath sounds. Abdominal:      General: Abdomen is flat. Bowel sounds are normal.      Palpations: Abdomen is soft. Musculoskeletal:         General: Normal range of motion. Cervical back: Normal range of motion and neck supple. No rigidity. No muscular tenderness. Skin:     General: Skin is warm and dry. Coloration: Skin is not pale. Neurological:      General: No focal deficit present. Mental Status: She is alert and oriented to person, place, and time. Psychiatric:         Mood and Affect: Mood normal.         Behavior: Behavior normal.         Thought Content: Thought content normal.         Judgment: Judgment normal.        MDM     VITAL SIGNS:  Patient Vitals for the past 4 hrs:   Temp Pulse Resp BP SpO2   09/18/22 0903 98.6 °F (37 °C) 81 18 112/79 95 %         LABS:  No results found for this or any previous visit (from the past 6 hour(s)). IMAGING:  No orders to display         Medications During Visit:  Medications - No data to display      DECISION MAKING:  Randy Sharp is a 74 y.o. female who comes in as above. Symptoms consistent with mild early COVID-19. Due to the patient's comorbidities, I had discussion with her that we could try antiviral medications due to her symptoms starting within the last 48 hours, which she was agreeable to. She was instructed to temporarily discontinue her pravastatin during therapy and ask her pharmacist if any of her other medications need to be withheld. The clinical decision making for this encounter included ordering and interpreting the above diagnostic tests with comparison to prior studies that are within our EMR. Past medical and surgical histories were reviewed, as were records from recent outpatient and emergency department visits. The above results discussed and reviewed with the patient. Patient verbalized understanding of the care plan, including any changes to current outpatient medication regimen, discussed disease process, symptom control, and follow-up care. Return precautions reviewed. IMPRESSION:  1. Exposure to confirmed case of COVID-19    2.  Flu-like symptoms        DISPOSITION:  Discharged      Current Discharge Medication List        START taking these medications    Details   nirmatrelvir-ritonavir (Paxlovid, EUA,) 300 mg (150 mg x 2)-100 mg See packaging for dosing instructions  Qty: 1 Box, Refills: 0  Start date: 9/18/2022              Follow-up Information       Follow up With Specialties Details Why Contact Edy Sousa, DO Family Medicine  As needed [address]  [phone]      [facility] EMERGENCY DEPT Emergency Medicine  If symptoms worsen [address] [facility]  [phone]              The patient is asked to follow-up with their primary care provider in the next several days. They are to call tomorrow for an appointment. The patient is asked to return promptly for any increased concerns or worsening of symptoms. They can return to this emergency department or any other emergency department.       Procedures

## 2022-10-10 NOTE — PROGRESS NOTES
Wing Branham MD., [address] [facility]    [address] [place], [address] [facility]    Office [phone],UUF [phone]           Fredo Pino is a 74 y.o. female - f/u visit  CC - as documented in EMR    Dear Dr. Sunni Feng, DO    I had the pleasure of seeing Ms. Fredo Pino in the office today. Assessment:     CAD -nonobstructive disease by cardiac cath 6/2021   Hypertension  Hyperlipidemia  History of CVA  Dizziness-. She has been seen by ENT physician and has been referred to neurology. Diagnosed to have vertigo. She has considered surgery but does not want to proceed for now. Anxiety    Plan:    Echocardiogram 3/2021 - Nml EF   C 6/22/21 - no sig CAD  (Pt does not want to do any kind of stress test because of her prior experience with it.)    Target LDL less than 70. Continue statin. Aggressive cardiovascular risk factor modification. Follow-up in 12months/earlier as needed    Patient understands the plan. All questions were answered to the patient's satisfaction. I appreciate the opportunity to be involved in Ms. Schmidt. See note below for details. Please do not hesitate to contact us   with questions or concerns. Wing Branham MD      Cardiac Testing/ Procedures: A. Cardiac Cath/PCI:  6/22/21  R Radial access - 6 F sheath  RCA - JR4; LCA - JL4     L Main: Very Short; Nml     LAD: Tortuous; Med; Mid 30%; D1/D2- MLI     LCflex: Large; MLI; Small OM1 and OM2; Large OM3 - MLI     RCA: Dominant; Mid 20%; Small PDA; Very small PLB      LVEDP:  8 mm Hg     LVEF: Not assessed     No significant gradient across aortic valve. PCI: none        Specimens Removed : None     Complications: None     Closure Device: R Radial - TR band      5/23/17-N STEMI-CJ W; left main-normal, LAD-mid 30-40%, left [id number]%, RCA-medium vessel small PDA; dfpjdlxo94%    B. ECHO/BRITTA: 3/24/21 EF 55-60%; Grade 1 DD    C. StressNuclear/Stress ECHO/Stress test:    D.Vascular:    E. EP:    F. Miscellaneous:    History:     Nerissa Swann is a 74 y.o. female who is referred for evaluation of chest pain. No chest pain/dyspnea. Hx of dizziness - chronic. Recently had COVID. Past Medical/Surgical and Family/Social History:     Past Medical History:   Diagnosis Date    Arthritis     Asthma     CAD (coronary artery disease)     MI 2017    Diverticula of colon     Gastrointestinal disorder     Hypertension     Neurological disorder     CVA     Stroke [facility])       Past Surgical History:   Procedure Laterality Date    COLONOSCOPY N/A 8/3/2022    COLONOSCOPY performed by Juan Soto MD at [facility]    HX APPENDECTOMY      HX CHOLECYSTECTOMY      HX GYN      hysterectomy    HX HERNIA REPAIR  11/10/2021    Hiatial Hernia By Dr Kristina Apgar      left knee replacement     Family History   Problem Relation Age of Onset    Stroke Mother     Coronary Art Dis Father       Social History     Tobacco Use    Smoking status: Former    Smokeless tobacco: Never   Substance Use Topics    Alcohol use: No            Medications:       Current Outpatient Medications   Medication Sig Dispense    nirmatrelvir-ritonavir (Paxlovid, EUA,) 300 mg (150 mg x 2)-100 mg See packaging for dosing instructions 1 Box    dilTIAZem IR (CARDIZEM) 60 mg tablet Take 60 mg by mouth two (2) times a day. Lactobacillus acidophilus (Probiotic) 10 billion cell cap Take  by mouth. carvediloL (COREG) 6.25 mg tablet Take 1 Tablet by mouth two (2) times daily (with meals). 180 Tablet    albuterol (PROVENTIL HFA, VENTOLIN HFA, PROAIR HFA) 90 mcg/actuation inhaler INHALE 2 PUFFS INTO THE LUNGS EVERY 4 HOURS AS NEEDED FOR 30 DAYS     ascorbic acid (VITAMIN C PO) Take  by mouth daily. elderberry fruit (ELDERBERRY PO) Take  by mouth daily.      ALPRAZolam (XANAX) 0.5 mg tablet TAKE 1 TABLET BY MOUTH ONCE DAILY AS NEEDED     omeprazole (PRILOSEC) 40 mg capsule Take 40 mg by mouth daily.     pravastatin (PRAVACHOL) 40 mg tablet Take 40 mg by mouth nightly. 1/2 tablet     aspirin (ASPIRIN) 325 mg tablet Take 325 mg by mouth daily. vitamin E (AQUA GEMS) 400 unit capsule Take  by mouth daily. cholecalciferol, vitamin D3, 50 mcg (2,000 unit) tab Take  by mouth daily. Multivitamins with Fluoride (MULTI-VITAMIN PO) Take  by mouth daily. (Patient not taking: Reported on 10/11/2022)      No current facility-administered medications for this visit. Review of Systems:     (bold if positive, if negative)    As in HPI    Physical Exam:     Visit Vitals  /84 (BP 1 Location: Left upper arm, BP Patient Position: Sitting)   Pulse 62   Ht 5' 3\" (1.6 m)   Wt 145 lb (65.8 kg)   SpO2 97%   BMI 25.69 kg/m²            Gen: Well-developed, well-nourished, in no acute distress  Neck: Supple,No JVD, No Carotid Bruit,   Resp: No accessory muscle use, Clear breath sounds, No rales or rhonchi  Card: Regular Rate,Rythm,Normal S1, S2, No murmurs, rubs or gallop. No thrills. Abd:  Soft, ,BS+,   MSK: No cyanosis  Skin: No rashes    Neuro: moving all four extremities , follows commands appropriately  Psych:  Good insight, oriented to person, place , alert, Nml Affect  LE: No edema    EKG: Sinus Rhythm, normal axis, normal intervals      Medication Side Effects and Warnings were discussed with patient: yes  Patient Labs were reviewed and/or requested:  yes  Patient Past Records were reviewed and/or requested: yes    Total time:       mins    ATTENTION:   This medical record was transcribed using an electronic medical records/speech recognition system. Although proofread, it may and can contain electronic, spelling and other errors. Corrections may be executed at a later time. Please feel free to contact us for any clarifications as needed.       Wing Branham MD

## 2022-10-11 ENCOUNTER — DOCUMENTATION ONLY (OUTPATIENT)
Dept: CARDIOLOGY CLINIC | Age: 72
End: 2022-10-11

## 2022-10-11 ENCOUNTER — OFFICE VISIT (OUTPATIENT)
Dept: CARDIOLOGY CLINIC | Age: 72
End: 2022-10-11
Payer: MEDICARE

## 2022-10-11 VITALS
WEIGHT: 145 LBS | OXYGEN SATURATION: 97 % | DIASTOLIC BLOOD PRESSURE: 84 MMHG | BODY MASS INDEX: 25.69 KG/M2 | HEIGHT: 63 IN | HEART RATE: 62 BPM | SYSTOLIC BLOOD PRESSURE: 124 MMHG

## 2022-10-11 DIAGNOSIS — I10 ESSENTIAL HYPERTENSION: ICD-10-CM

## 2022-10-11 DIAGNOSIS — E78.5 DYSLIPIDEMIA: ICD-10-CM

## 2022-10-11 DIAGNOSIS — I25.10 CORONARY ARTERY DISEASE INVOLVING NATIVE CORONARY ARTERY OF NATIVE HEART WITHOUT ANGINA PECTORIS: Primary | ICD-10-CM

## 2022-10-11 DIAGNOSIS — R42 DIZZINESS: ICD-10-CM

## 2022-10-11 PROCEDURE — G8427 DOCREV CUR MEDS BY ELIG CLIN: HCPCS | Performed by: INTERNAL MEDICINE

## 2022-10-11 PROCEDURE — 3017F COLORECTAL CA SCREEN DOC REV: CPT | Performed by: INTERNAL MEDICINE

## 2022-10-11 PROCEDURE — G8400 PT W/DXA NO RESULTS DOC: HCPCS | Performed by: INTERNAL MEDICINE

## 2022-10-11 PROCEDURE — G8432 DEP SCR NOT DOC, RNG: HCPCS | Performed by: INTERNAL MEDICINE

## 2022-10-11 PROCEDURE — 1101F PT FALLS ASSESS-DOCD LE1/YR: CPT | Performed by: INTERNAL MEDICINE

## 2022-10-11 PROCEDURE — 1090F PRES/ABSN URINE INCON ASSESS: CPT | Performed by: INTERNAL MEDICINE

## 2022-10-11 PROCEDURE — G8417 CALC BMI ABV UP PARAM F/U: HCPCS | Performed by: INTERNAL MEDICINE

## 2022-10-11 PROCEDURE — 99214 OFFICE O/P EST MOD 30 MIN: CPT | Performed by: INTERNAL MEDICINE

## 2022-10-11 PROCEDURE — G8536 NO DOC ELDER MAL SCRN: HCPCS | Performed by: INTERNAL MEDICINE

## 2022-10-11 PROCEDURE — G8752 SYS BP LESS 140: HCPCS | Performed by: INTERNAL MEDICINE

## 2022-10-11 PROCEDURE — G8754 DIAS BP LESS 90: HCPCS | Performed by: INTERNAL MEDICINE

## 2022-10-11 PROCEDURE — 1123F ACP DISCUSS/DSCN MKR DOCD: CPT | Performed by: INTERNAL MEDICINE

## 2022-10-11 PROCEDURE — G0463 HOSPITAL OUTPT CLINIC VISIT: HCPCS | Performed by: INTERNAL MEDICINE

## 2022-10-11 NOTE — PROGRESS NOTES
Tulio Booker   Lipid 4/18/2022:      Chol          135  TG              93  HDL           48  LDL             68  VLDL            19  CholH            2.8  NHDL             87

## 2022-10-11 NOTE — LETTER
10/11/2022    Patient: Shelly Collazo   YOB: 1950   Date of Visit: 10/11/2022     Rajan Goff DO  [address] [address]  Via Fax: [phone]    Dear Juan Ramon Pena DO,      Thank you for referring Ms. Amna Salazar to [facility] for evaluation. My notes for this consultation are attached. If you have questions, please do not hesitate to call me. I look forward to following your patient along with you.       Sincerely,    Gómez Noyola MD

## 2022-10-11 NOTE — PROGRESS NOTES
Chief Complaint   Patient presents with    Hospital Follow Up     ER- CVA    Coronary Artery Disease     Visit Vitals  /84 (BP 1 Location: Left upper arm, BP Patient Position: Sitting)   Pulse 62   Ht 5' 3\" (1.6 m)   Wt 145 lb (65.8 kg)   SpO2 97%   BMI 25.69 kg/m²     Chest pain denied   SOB - Asthma, had Covid 4 weeks ago.    Palpitations denied   Swelling in hands/feet denied   Dizziness - Vertigo   Recent hospital stays denied   Refills denied

## 2022-12-12 LAB — SARS-COV-2: NEGATIVE

## 2023-01-07 ENCOUNTER — APPOINTMENT (OUTPATIENT)
Dept: GENERAL RADIOLOGY | Age: 73
End: 2023-01-07
Attending: STUDENT IN AN ORGANIZED HEALTH CARE EDUCATION/TRAINING PROGRAM
Payer: MEDICARE

## 2023-01-07 ENCOUNTER — HOSPITAL ENCOUNTER (EMERGENCY)
Age: 73
Discharge: HOME | End: 2023-01-07
Attending: STUDENT IN AN ORGANIZED HEALTH CARE EDUCATION/TRAINING PROGRAM
Payer: MEDICARE

## 2023-01-07 VITALS
BODY MASS INDEX: 25.39 KG/M2 | HEIGHT: 63 IN | TEMPERATURE: 97.6 F | DIASTOLIC BLOOD PRESSURE: 77 MMHG | OXYGEN SATURATION: 95 % | SYSTOLIC BLOOD PRESSURE: 144 MMHG | WEIGHT: 143.3 LBS | RESPIRATION RATE: 16 BRPM | HEART RATE: 56 BPM

## 2023-01-07 DIAGNOSIS — G93.31 POST VIRAL SYNDROME: Primary | ICD-10-CM

## 2023-01-07 LAB
ALBUMIN SERPL-MCNC: 3.5 G/DL (ref 3.5–5)
ALBUMIN/GLOB SERPL: 1.1 (ref 1.1–2.2)
ALP SERPL-CCNC: 78 U/L (ref 45–117)
ALT SERPL-CCNC: 22 U/L (ref 12–78)
ANION GAP SERPL CALC-SCNC: 7 MMOL/L (ref 5–15)
AST SERPL-CCNC: 18 U/L (ref 15–37)
ATRIAL RATE: 66 BPM
BASOPHILS # BLD: 0 K/UL (ref 0–0.1)
BASOPHILS NFR BLD: 1 % (ref 0–1)
BILIRUB SERPL-MCNC: 0.3 MG/DL (ref 0.2–1)
BUN SERPL-MCNC: 21 MG/DL (ref 6–20)
BUN/CREAT SERPL: 20 (ref 12–20)
CALCIUM SERPL-MCNC: 9 MG/DL (ref 8.5–10.1)
CALCULATED P AXIS, ECG09: 1 DEGREES
CALCULATED R AXIS, ECG10: 15 DEGREES
CALCULATED T AXIS, ECG11: 27 DEGREES
CHLORIDE SERPL-SCNC: 104 MMOL/L (ref 97–108)
CO2 SERPL-SCNC: 29 MMOL/L (ref 21–32)
CREAT SERPL-MCNC: 1.06 MG/DL (ref 0.55–1.02)
DIAGNOSIS, 93000: NORMAL
DIFFERENTIAL METHOD BLD: NORMAL
EOSINOPHIL # BLD: 0.2 K/UL (ref 0–0.4)
EOSINOPHIL NFR BLD: 3 % (ref 0–7)
ERYTHROCYTE [DISTWIDTH] IN BLOOD BY AUTOMATED COUNT: 13.6 % (ref 11.5–14.5)
GLOBULIN SER CALC-MCNC: 3.2 G/DL (ref 2–4)
GLUCOSE SERPL-MCNC: 96 MG/DL (ref 65–100)
HCT VFR BLD AUTO: 36.5 % (ref 35–47)
HGB BLD-MCNC: 12.1 G/DL (ref 11.5–16)
IMM GRANULOCYTES # BLD AUTO: 0 K/UL (ref 0–0.04)
IMM GRANULOCYTES NFR BLD AUTO: 0 % (ref 0–0.5)
LYMPHOCYTES # BLD: 2.8 K/UL (ref 0.8–3.5)
LYMPHOCYTES NFR BLD: 34 % (ref 12–49)
MCH RBC QN AUTO: 31.8 PG (ref 26–34)
MCHC RBC AUTO-ENTMCNC: 33.2 G/DL (ref 30–36.5)
MCV RBC AUTO: 96.1 FL (ref 80–99)
MONOCYTES # BLD: 0.6 K/UL (ref 0–1)
MONOCYTES NFR BLD: 7 % (ref 5–13)
NEUTS SEG # BLD: 4.6 K/UL (ref 1.8–8)
NEUTS SEG NFR BLD: 56 % (ref 32–75)
NRBC # BLD: 0 K/UL (ref 0–0.01)
NRBC BLD-RTO: 0 PER 100 WBC
P-R INTERVAL, ECG05: 178 MS
PLATELET # BLD AUTO: 215 K/UL (ref 150–400)
PMV BLD AUTO: 10.9 FL (ref 8.9–12.9)
POTASSIUM SERPL-SCNC: 4.2 MMOL/L (ref 3.5–5.1)
PROT SERPL-MCNC: 6.7 G/DL (ref 6.4–8.2)
Q-T INTERVAL, ECG07: 404 MS
QRS DURATION, ECG06: 76 MS
QTC CALCULATION (BEZET), ECG08: 423 MS
RBC # BLD AUTO: 3.8 M/UL (ref 3.8–5.2)
SODIUM SERPL-SCNC: 140 MMOL/L (ref 136–145)
TROPONIN-HIGH SENSITIVITY: 6 NG/L (ref 0–51)
VENTRICULAR RATE, ECG03: 66 BPM
WBC # BLD AUTO: 8.3 K/UL (ref 3.6–11)

## 2023-01-07 PROCEDURE — 71045 X-RAY EXAM CHEST 1 VIEW: CPT

## 2023-01-07 PROCEDURE — 99285 EMERGENCY DEPT VISIT HI MDM: CPT | Performed by: STUDENT IN AN ORGANIZED HEALTH CARE EDUCATION/TRAINING PROGRAM

## 2023-01-07 PROCEDURE — 85025 COMPLETE CBC W/AUTO DIFF WBC: CPT

## 2023-01-07 PROCEDURE — 80053 COMPREHEN METABOLIC PANEL: CPT

## 2023-01-07 PROCEDURE — 36415 COLL VENOUS BLD VENIPUNCTURE: CPT

## 2023-01-07 PROCEDURE — 93005 ELECTROCARDIOGRAM TRACING: CPT

## 2023-01-07 PROCEDURE — 84484 ASSAY OF TROPONIN QUANT: CPT

## 2023-01-07 RX ORDER — PRAVASTATIN SODIUM 20 MG/1
10 TABLET ORAL
COMMUNITY

## 2023-01-07 RX ORDER — PREDNISONE 5 MG/1
2.5 TABLET ORAL
COMMUNITY

## 2023-01-07 RX ORDER — DIAZEPAM 2 MG/1
2 TABLET ORAL
COMMUNITY

## 2023-01-07 NOTE — ED NOTES
Pt given discharge instructions by Dr Shae Almanza she verbalizes an understanding pt stable at time of discharge

## 2023-01-07 NOTE — ED TRIAGE NOTES
Pt ambulates to treatment area she states that for months now she has been battling first Covid and then most recently the Flu. She has been on steroids low dose for her SOB and just recently was put on a second round of steroids. Over the past couple of days she has felt more fatigued and weak but she is not able to sleep nights and she has no appetite either. She is here to get checked out and have a chest xray because she feels like she is SOB.   Pt is speaking in full sentences without SOB after ambulating to room O2 sats on room air are 98%

## 2023-01-10 LAB
ATRIAL RATE: 66 BPM
CALCULATED P AXIS, ECG09: 1 DEGREES
CALCULATED R AXIS, ECG10: 15 DEGREES
CALCULATED T AXIS, ECG11: 27 DEGREES
DIAGNOSIS, 93000: NORMAL
P-R INTERVAL, ECG05: 178 MS
Q-T INTERVAL, ECG07: 404 MS
QRS DURATION, ECG06: 76 MS
QTC CALCULATION (BEZET), ECG08: 423 MS
VENTRICULAR RATE, ECG03: 66 BPM

## 2023-01-12 NOTE — ED PROVIDER NOTES
Patient is a 78-year-old female present emergency department for shortness of breath, fatigue, weakness. Patient states that her symptoms started a few months ago when she first had COVID and then recently patient was diagnosed with the flu. She states that she has completed courses of steroids x2 however over the last several days she has been extremely fatigued and weak also having trouble sleeping at night as well as decreased appetite. Patient denies any chest pain she is having subjective shortness of breath on arrival patient's O2 sats on room air were 98%. Patient has a history of asthma, coronary artery disease, CVA.        Past Medical History:   Diagnosis Date    Arthritis     Asthma     CAD (coronary artery disease)     MI 5/2017    COVID     Diverticula of colon     Flu     Gastrointestinal disorder     Hypertension     Neurological disorder     CVA     Stroke [facility])        Past Surgical History:   Procedure Laterality Date    COLONOSCOPY N/A 8/3/2022    COLONOSCOPY performed by Varinder López MD at [facility]    HX APPENDECTOMY      HX CHOLECYSTECTOMY      HX GYN      hysterectomy    HX HERNIA REPAIR  11/10/2021    Hiatial Hernia By Dr Clancy Held      left knee replacement         Family History:   Problem Relation Age of Onset    Stroke Mother     Coronary Art Dis Father        Social History     Socioeconomic History    Marital status:      Spouse name: Not on file    Number of children: Not on file    Years of education: Not on file    Highest education level: Not on file   Occupational History    Not on file   Tobacco Use    Smoking status: Former    Smokeless tobacco: Never   Substance and Sexual Activity    Alcohol use: No    Drug use: Not on file    Sexual activity: Not on file   Other Topics Concern    Not on file   Social History Narrative    Not on file     Social Determinants of Health     Financial Resource Strain: Not on file   Food Insecurity: Not on file Transportation Needs: Not on file   Physical Activity: Not on file   Stress: Not on file   Social Connections: Not on file   Intimate Partner Violence: Not on file   Housing Stability: Not on file         ALLERGIES: Latex, Morphine, Lidocaine, Ciprofloxacin, Hydrocodone, Levaquin [levofloxacin], Other medication, Pcn [penicillins], Shellfish derived, and Sulfa (sulfonamide antibiotics)    Review of Systems   Constitutional:  Positive for activity change, appetite change and fatigue. Respiratory:  Positive for shortness of breath. Neurological:  Positive for weakness. All other systems reviewed and are negative. Vitals:    01/07/23 1438 01/07/23 1453 01/07/23 1508 01/07/23 1523   BP: (!) 148/67 (!) 150/85 138/74 (!) 144/77   Pulse:       Resp:       Temp:       SpO2: 96% 96% 95% 95%   Weight:       Height:                Physical Exam  Vitals and nursing note reviewed. Constitutional:       General: She is not in acute distress. Appearance: Normal appearance. She is well-developed. She is not diaphoretic. HENT:      Head: Normocephalic and atraumatic. Nose: Nose normal.      Mouth/Throat:      Mouth: Mucous membranes are moist.      Pharynx: No oropharyngeal exudate. Eyes:      General: No scleral icterus. Right eye: No discharge. Left eye: No discharge. Extraocular Movements: Extraocular movements intact. Conjunctiva/sclera: Conjunctivae normal.      Pupils: Pupils are equal, round, and reactive to light. Neck:      Thyroid: No thyromegaly. Vascular: No JVD. Trachea: No tracheal deviation. Cardiovascular:      Rate and Rhythm: Regular rhythm. Bradycardia present. Pulses: Normal pulses. Heart sounds: Normal heart sounds. No murmur heard. No friction rub. No gallop. Pulmonary:      Effort: Pulmonary effort is normal. No respiratory distress. Breath sounds: Normal breath sounds. No stridor. No wheezing or rales.    Chest:      Chest wall: No tenderness. Abdominal:      General: Abdomen is flat. Bowel sounds are normal. There is no distension. Palpations: Abdomen is soft. There is no mass. Tenderness: There is no abdominal tenderness. There is no rebound. Musculoskeletal:         General: No tenderness. Normal range of motion. Cervical back: Normal range of motion and neck supple. Lymphadenopathy:      Cervical: No cervical adenopathy. Skin:     General: Skin is warm and dry. Coloration: Skin is not pale. Findings: No erythema or rash. Neurological:      General: No focal deficit present. Mental Status: She is alert and oriented to person, place, and time. Cranial Nerves: No cranial nerve deficit. Coordination: Coordination normal.   Psychiatric:         Mood and Affect: Mood normal.         Behavior: Behavior normal.         Thought Content: Thought content normal.         Judgment: Judgment normal.        Medical Decision Making  Post viral syndrome. 57-year-old female present emergency department with weakness, fatigue, decreased appetite patient has had COVID and most recently flu. Has not regained full strength since having both of these viral illnesses. Patient's labs as well as chest x-ray that were ordered are reassuring no evidence of airspace disease or severe dehydration. EKG also unremarkable. Patient will be discharged. Amount and/or Complexity of Data Reviewed  Labs: ordered. Radiology: ordered and independent interpretation performed. Decision-making details documented in ED Course. ECG/medicine tests: ordered and independent interpretation performed. Decision-making details documented in ED Course. ED Course as of 01/12/23 0021   Sat Jan 07, 2023   1508 EKG shows a sinus rhythm with a rate of 66 occasional PVC no ST or T wave abnormalities to suggest ischemia or infarct. Normal intervals, normal axis.  [DA]      ED Course User Index  [DA] Shayy Hairston MD Procedures

## 2023-04-03 ENCOUNTER — TELEPHONE (OUTPATIENT)
Dept: NEUROLOGY | Age: 73
End: 2023-04-03

## 2023-04-03 NOTE — TELEPHONE ENCOUNTER
Spoke with patient,  verified, and patient would like results of MRA of neck and brain, and MRI of brain.

## 2023-04-06 ENCOUNTER — TELEPHONE (OUTPATIENT)
Dept: NEUROLOGY | Age: 73
End: 2023-04-06

## 2023-04-06 NOTE — TELEPHONE ENCOUNTER
Spoke with [facility], wondering if patient has had an MRI brain since patient is scheduled to have surgery and patient told nurse she had problem and had to have MRI. Ashleigh Overall would like the MRI of brain faxed. Informed Ashleigh Overall it was 3/17/2022. Ashleigh Overall stated that was fine. MRI and MRA of brain faxed to Ashleigh Overall at [phone].

## 2023-05-10 ENCOUNTER — HOSPITAL ENCOUNTER (OUTPATIENT)
Facility: HOSPITAL | Age: 73
Discharge: HOME | End: 2023-05-13
Payer: MEDICARE

## 2023-05-10 VITALS
TEMPERATURE: 96.9 F | HEIGHT: 63 IN | DIASTOLIC BLOOD PRESSURE: 62 MMHG | SYSTOLIC BLOOD PRESSURE: 132 MMHG | RESPIRATION RATE: 16 BRPM | BODY MASS INDEX: 26.31 KG/M2 | HEART RATE: 74 BPM | WEIGHT: 148.5 LBS

## 2023-05-10 DIAGNOSIS — Z01.811 PRE-OP CHEST EXAM: ICD-10-CM

## 2023-05-10 LAB
ALBUMIN SERPL-MCNC: 3.5 G/DL (ref 3.5–5)
ALBUMIN/GLOB SERPL: 1.1 (ref 1.1–2.2)
ALP SERPL-CCNC: 89 U/L (ref 45–117)
ALT SERPL-CCNC: 19 U/L (ref 12–78)
ANION GAP SERPL CALC-SCNC: 2 MMOL/L (ref 5–15)
APPEARANCE UR: CLEAR
AST SERPL-CCNC: 15 U/L (ref 15–37)
BACTERIA URNS QL MICRO: NEGATIVE /HPF
BASOPHILS # BLD: 0.1 K/UL (ref 0–0.1)
BASOPHILS NFR BLD: 1 % (ref 0–1)
BILIRUB SERPL-MCNC: 0.2 MG/DL (ref 0.2–1)
BILIRUB UR QL: NEGATIVE
BUN SERPL-MCNC: 18 MG/DL (ref 6–20)
BUN/CREAT SERPL: 24 (ref 12–20)
CALCIUM SERPL-MCNC: 9.1 MG/DL (ref 8.5–10.1)
CHLORIDE SERPL-SCNC: 108 MMOL/L (ref 97–108)
CO2 SERPL-SCNC: 29 MMOL/L (ref 21–32)
COLOR UR: ABNORMAL
CREAT SERPL-MCNC: 0.75 MG/DL (ref 0.55–1.02)
DIFFERENTIAL METHOD BLD: ABNORMAL
EOSINOPHIL # BLD: 0.7 K/UL (ref 0–0.4)
EOSINOPHIL NFR BLD: 9 % (ref 0–7)
EPITH CASTS URNS QL MICRO: ABNORMAL /LPF
ERYTHROCYTE [DISTWIDTH] IN BLOOD BY AUTOMATED COUNT: 12.6 % (ref 11.5–14.5)
GLOBULIN SER CALC-MCNC: 3.1 G/DL (ref 2–4)
GLUCOSE SERPL-MCNC: 115 MG/DL (ref 65–100)
GLUCOSE UR STRIP.AUTO-MCNC: NEGATIVE MG/DL
HCT VFR BLD AUTO: 39.8 % (ref 35–47)
HGB BLD-MCNC: 13 G/DL (ref 11.5–16)
HGB UR QL STRIP: NEGATIVE
IMM GRANULOCYTES # BLD AUTO: 0 K/UL (ref 0–0.04)
IMM GRANULOCYTES NFR BLD AUTO: 0 % (ref 0–0.5)
KETONES UR QL STRIP.AUTO: NEGATIVE MG/DL
LEUKOCYTE ESTERASE UR QL STRIP.AUTO: ABNORMAL
LYMPHOCYTES # BLD: 1.9 K/UL (ref 0.8–3.5)
LYMPHOCYTES NFR BLD: 24 % (ref 12–49)
MCH RBC QN AUTO: 31.9 PG (ref 26–34)
MCHC RBC AUTO-ENTMCNC: 32.7 G/DL (ref 30–36.5)
MCV RBC AUTO: 97.8 FL (ref 80–99)
MONOCYTES # BLD: 0.5 K/UL (ref 0–1)
MONOCYTES NFR BLD: 7 % (ref 5–13)
NEUTS SEG # BLD: 4.7 K/UL (ref 1.8–8)
NEUTS SEG NFR BLD: 59 % (ref 32–75)
NITRITE UR QL STRIP.AUTO: NEGATIVE
NRBC # BLD: 0 K/UL (ref 0–0.01)
NRBC BLD-RTO: 0 PER 100 WBC
PH UR STRIP: 5 (ref 5–8)
PLATELET # BLD AUTO: 213 K/UL (ref 150–400)
PMV BLD AUTO: 11.5 FL (ref 8.9–12.9)
POTASSIUM SERPL-SCNC: 4.2 MMOL/L (ref 3.5–5.1)
PROT SERPL-MCNC: 6.6 G/DL (ref 6.4–8.2)
PROT UR STRIP-MCNC: NEGATIVE MG/DL
RBC # BLD AUTO: 4.07 M/UL (ref 3.8–5.2)
RBC #/AREA URNS HPF: ABNORMAL /HPF (ref 0–5)
SODIUM SERPL-SCNC: 139 MMOL/L (ref 136–145)
SP GR UR REFRACTOMETRY: 1.02 (ref 1–1.03)
URINE CULTURE IF INDICATED: ABNORMAL
UROBILINOGEN UR QL STRIP.AUTO: 1 EU/DL (ref 0.2–1)
WBC # BLD AUTO: 7.9 K/UL (ref 3.6–11)
WBC URNS QL MICRO: ABNORMAL /HPF (ref 0–4)

## 2023-05-10 PROCEDURE — 71046 X-RAY EXAM CHEST 2 VIEWS: CPT

## 2023-05-10 PROCEDURE — 36415 COLL VENOUS BLD VENIPUNCTURE: CPT

## 2023-05-10 PROCEDURE — 81001 URINALYSIS AUTO W/SCOPE: CPT

## 2023-05-10 PROCEDURE — 85025 COMPLETE CBC W/AUTO DIFF WBC: CPT

## 2023-05-10 PROCEDURE — 80053 COMPREHEN METABOLIC PANEL: CPT

## 2023-05-10 RX ORDER — CEFDINIR 300 MG/1
300 CAPSULE ORAL 2 TIMES DAILY
COMMUNITY

## 2023-05-10 RX ORDER — DIPHENHYDRAMINE HYDROCHLORIDE 25 MG/1
1 TABLET ORAL EVERY MORNING
COMMUNITY

## 2023-05-10 RX ORDER — ACETAMINOPHEN 325 MG/1
325 TABLET ORAL EVERY 6 HOURS PRN
COMMUNITY

## 2023-05-10 RX ORDER — FAMOTIDINE 10 MG
10 TABLET ORAL DAILY
COMMUNITY
End: 2023-05-10

## 2023-05-10 RX ORDER — CEPHALEXIN 250 MG/1
250 CAPSULE ORAL DAILY
COMMUNITY
End: 2023-05-10

## 2023-05-10 ASSESSMENT — ENCOUNTER SYMPTOMS
VOMITING: 0
ABDOMINAL PAIN: 0
BLOOD IN STOOL: 0
SHORTNESS OF BREATH: 0
SORE THROAT: 0
TROUBLE SWALLOWING: 0
COUGH: 0
WHEEZING: 0
NAUSEA: 0

## 2023-05-10 NOTE — H&P
Pre-Operative Risk Assessment Using 2014 ACC/AHA Guidelines     Emergent procedure: No  Active Cardiac Condition including decompensated HF, Arrhythmia, MI <3 weeks, severe valve disease: No  Risk Level of Procedure: intermediate risk  Revised Cardiac Risk Index Risk factors: History of ischemic heart disease (+1), History of cerebrovascular disease (+1), and Total Score 2  10.1% risk of cardiac complications during or around noncardiac surgery (30 day risk of Death, MI or Cardiac arrest). Measurement of Exercise Tolerance before Surgery >4 METS (climbing > 1 flight of stairs without stopping, walking up hill > 1-2 blocks, scrubbing floors, moving furniture, golf, bowling, dancing or tennis, or running short distance): Yes    History of cardiac (including arrhythmic or valvular) or lung issues? CAD  Personal or family of bleeding issues? No  Hx of HTN? Yes        Controlled? Yes     Smoker? No -- former  Etoh or other substance use? No     On anticoagulation, insulin, or steroids? yes --  mg daily  Any concern with current medications? No  Pacemaker present? No        and if so, has it been interrogated in the last 12 months? N/A  Known DOREEN? No   Known liver disease? No    Blood pressure (!) 164/67, pulse 67, temperature 96.9 °F (36.1 °C), temperature source Temporal, resp. rate 16, height 1.6 m (5' 3\"), weight 67.4 kg (148 lb 8 oz). Review of Systems  Review of Systems   Constitutional:  Negative for chills, fatigue and fever. HENT:  Negative for congestion, hearing loss, sore throat and trouble swallowing. Eyes:  Negative for visual disturbance. Respiratory:  Negative for cough, shortness of breath and wheezing. Cardiovascular:  Negative for chest pain, palpitations and leg swelling. Gastrointestinal:  Negative for abdominal pain, blood in stool, nausea and vomiting. Genitourinary:  Positive for frequency and urgency. Negative for dysuria and hematuria.    Musculoskeletal:

## 2023-05-10 NOTE — PERIOP NOTE
[address] [address] [facility]   MAIN OR                                  [phone]   MAIN PRE OP                          [phone]                                                                                AMBULATORY PRE OP          [phone]  PRE-ADMISSION TESTING    [phone]   Surgery Date:  Monday 5/22/23       Is surgery arrival time given by surgeon? NO  If NO, [address] [facility] staff will call you between 3 and 7pm the day before your surgery with your arrival time. (If your surgery is on a Monday, we will call you the Friday before.)    Call [phone] after 7pm Monday-Friday if you did not receive this call.     INSTRUCTIONS BEFORE YOUR SURGERY   When You  Arrive Arrive at the 2nd [address] [facility] on the day of your surgery  Have your insurance card, photo ID, and any copayment (if needed)   Food   and   Drink NO food or drink after midnight the night before surgery    This means NO water, gum, mints, coffee, juice, etc.  No alcohol (beer, wine, liquor) 24 hours before and after surgery   Medications to   TAKE   Morning of Surgery MEDICATIONS TO TAKE THE MORNING OF SURGERY WITH A SIP OF WATER:   Albuterol inhaler if needed (bring with you day of surgery)  Carvedilol (Coreg)  Diltiazem (Cardizem)  Omeprazole (Prilosec)     Medications  To  STOP      7 days before surgery Non-Steroidal anti-inflammatory Drugs (NSAID's): for example, Ibuprofen (Advil, Motrin), Naproxen (Aleve)  Aspirin, if taking for pain   Herbal supplements, vitamins, and fish oil  Other:  (Pain medications not listed above, including Tylenol may be taken)   Blood  Thinners Stop taking your aspirin 1 week before surgery (5/15/23)   Bathing Clothing  Jewelry  Valuables     If you shower the morning of surgery, please do not apply anything to your skin (lotions, powders, deodorant, or makeup, especially mascara)  Do not shave or trim anywhere 24 hours

## 2023-05-11 LAB
EKG ATRIAL RATE: 69 BPM
EKG DIAGNOSIS: NORMAL
EKG P AXIS: 58 DEGREES
EKG P-R INTERVAL: 214 MS
EKG Q-T INTERVAL: 402 MS
EKG QRS DURATION: 82 MS
EKG QTC CALCULATION (BAZETT): 430 MS
EKG R AXIS: 44 DEGREES
EKG T AXIS: 33 DEGREES
EKG VENTRICULAR RATE: 69 BPM

## 2023-05-11 NOTE — PERIOP NOTE
Patient stated that Dr Hernando Correa office told her they would call and get clearance from her cardiologist. We do not have clearance as of yet, faxed request to Dr Meme Tyler office for clearance.

## 2023-05-16 ENCOUNTER — TELEPHONE (OUTPATIENT)
Age: 73
End: 2023-05-16

## 2023-05-16 NOTE — TELEPHONE ENCOUNTER
ALLERGY SOLUTION RE-ORDER REQUEST    Nela Mares 1962 MRN: [id number]    DATE NEEDED:  3/28/2019  Vial Color Content   Top Dose   Last Dose Vial Size  Red 1:1 Cat, Dog, Dust Mite   Red 1:1 0.5   Red 1:10.5 5ml  Red 1:1 Trees, Weeds   Red 1:1 0.5   Red 1:10.5 5ml  Red 1:1 Molds   Red 1:1 0.5   Red 1:10.5 5ml        Serum reorder consent signed and patient/parent was advised that new serums would be ordered through the pharmacy and billed to their insurance company when they arrive in clinic. Yes    Shot Clinic Location:  [place]  Ship to Location: [place]  Serum billed to:  [place]    Special Instructions:  none      Updated Prescription Needed: No      Requester Signature  Sterling Elkins RN....3/7/2019 9:16 AM        Cardiac clearance requested for upcoming CYSTOSCOPY WITH HYDRODISTENTION requested. Last office visit 10/22. Last echo 6/2021. Patient declined stress test per last office note due to previous experience. Notified PAT patient needs office evaluation. First availability 6/12 @ 2:20pm.    Please advise.

## 2023-05-18 ENCOUNTER — HOSPITAL ENCOUNTER (OUTPATIENT)
Facility: HOSPITAL | Age: 73
Discharge: HOME | End: 2023-05-18
Payer: MEDICARE

## 2023-05-18 DIAGNOSIS — R10.11 RUQ ABDOMINAL PAIN: ICD-10-CM

## 2023-05-18 DIAGNOSIS — J02.9 ACUTE PHARYNGITIS, UNSPECIFIED ETIOLOGY: ICD-10-CM

## 2023-05-18 PROCEDURE — 74160 CT ABDOMEN W/CONTRAST: CPT

## 2023-05-18 PROCEDURE — 6360000004 HC RX CONTRAST MEDICATION: Performed by: FAMILY MEDICINE

## 2023-05-18 RX ADMIN — IOPAMIDOL 100 ML: 755 INJECTION, SOLUTION INTRAVENOUS at 08:51

## 2023-05-22 ENCOUNTER — ANESTHESIA EVENT (OUTPATIENT)
Facility: HOSPITAL | Age: 73
End: 2023-05-22
Payer: MEDICARE

## 2023-05-22 ENCOUNTER — ANESTHESIA (OUTPATIENT)
Facility: HOSPITAL | Age: 73
End: 2023-05-22
Payer: MEDICARE

## 2023-05-22 ENCOUNTER — HOSPITAL ENCOUNTER (OUTPATIENT)
Facility: HOSPITAL | Age: 73
Setting detail: OUTPATIENT SURGERY
Discharge: HOME | End: 2023-05-22
Attending: UROLOGY | Admitting: UROLOGY
Payer: MEDICARE

## 2023-05-22 VITALS
RESPIRATION RATE: 14 BRPM | HEIGHT: 63 IN | OXYGEN SATURATION: 98 % | BODY MASS INDEX: 25.52 KG/M2 | HEART RATE: 64 BPM | SYSTOLIC BLOOD PRESSURE: 142 MMHG | TEMPERATURE: 97.6 F | WEIGHT: 144 LBS | DIASTOLIC BLOOD PRESSURE: 68 MMHG

## 2023-05-22 PROCEDURE — 88112 CYTOPATH CELL ENHANCE TECH: CPT

## 2023-05-22 PROCEDURE — 3700000000 HC ANESTHESIA ATTENDED CARE: Performed by: UROLOGY

## 2023-05-22 PROCEDURE — 3700000001 HC ADD 15 MINUTES (ANESTHESIA): Performed by: UROLOGY

## 2023-05-22 PROCEDURE — 6360000002 HC RX W HCPCS: Performed by: UROLOGY

## 2023-05-22 PROCEDURE — 6360000002 HC RX W HCPCS: Performed by: NURSE ANESTHETIST, CERTIFIED REGISTERED

## 2023-05-22 PROCEDURE — 2580000003 HC RX 258: Performed by: UROLOGY

## 2023-05-22 PROCEDURE — 7100000010 HC PHASE II RECOVERY - FIRST 15 MIN: Performed by: UROLOGY

## 2023-05-22 PROCEDURE — 7100000000 HC PACU RECOVERY - FIRST 15 MIN: Performed by: UROLOGY

## 2023-05-22 PROCEDURE — 87086 URINE CULTURE/COLONY COUNT: CPT

## 2023-05-22 PROCEDURE — 7100000011 HC PHASE II RECOVERY - ADDTL 15 MIN: Performed by: UROLOGY

## 2023-05-22 PROCEDURE — 2580000003 HC RX 258: Performed by: ANESTHESIOLOGY

## 2023-05-22 PROCEDURE — 3600000002 HC SURGERY LEVEL 2 BASE: Performed by: UROLOGY

## 2023-05-22 PROCEDURE — 2709999900 HC NON-CHARGEABLE SUPPLY: Performed by: UROLOGY

## 2023-05-22 PROCEDURE — 7100000001 HC PACU RECOVERY - ADDTL 15 MIN: Performed by: UROLOGY

## 2023-05-22 PROCEDURE — 3600000012 HC SURGERY LEVEL 2 ADDTL 15MIN: Performed by: UROLOGY

## 2023-05-22 PROCEDURE — 2500000003 HC RX 250 WO HCPCS: Performed by: UROLOGY

## 2023-05-22 RX ORDER — SODIUM CHLORIDE, SODIUM LACTATE, POTASSIUM CHLORIDE, CALCIUM CHLORIDE 600; 310; 30; 20 MG/100ML; MG/100ML; MG/100ML; MG/100ML
INJECTION, SOLUTION INTRAVENOUS CONTINUOUS
Status: DISCONTINUED | OUTPATIENT
Start: 2023-05-22 | End: 2023-05-22 | Stop reason: HOSPADM

## 2023-05-22 RX ORDER — KETOROLAC TROMETHAMINE 30 MG/ML
INJECTION, SOLUTION INTRAMUSCULAR; INTRAVENOUS PRN
Status: DISCONTINUED | OUTPATIENT
Start: 2023-05-22 | End: 2023-05-22 | Stop reason: SDUPTHER

## 2023-05-22 RX ORDER — MIDAZOLAM HYDROCHLORIDE 1 MG/ML
INJECTION INTRAMUSCULAR; INTRAVENOUS PRN
Status: DISCONTINUED | OUTPATIENT
Start: 2023-05-22 | End: 2023-05-22 | Stop reason: SDUPTHER

## 2023-05-22 RX ORDER — PROPOFOL 10 MG/ML
INJECTION, EMULSION INTRAVENOUS PRN
Status: DISCONTINUED | OUTPATIENT
Start: 2023-05-22 | End: 2023-05-22 | Stop reason: SDUPTHER

## 2023-05-22 RX ORDER — FENTANYL CITRATE 50 UG/ML
INJECTION, SOLUTION INTRAMUSCULAR; INTRAVENOUS PRN
Status: DISCONTINUED | OUTPATIENT
Start: 2023-05-22 | End: 2023-05-22 | Stop reason: SDUPTHER

## 2023-05-22 RX ORDER — DEXAMETHASONE SODIUM PHOSPHATE 4 MG/ML
INJECTION, SOLUTION INTRA-ARTICULAR; INTRALESIONAL; INTRAMUSCULAR; INTRAVENOUS; SOFT TISSUE PRN
Status: DISCONTINUED | OUTPATIENT
Start: 2023-05-22 | End: 2023-05-22 | Stop reason: SDUPTHER

## 2023-05-22 RX ORDER — ONDANSETRON 2 MG/ML
4 INJECTION INTRAMUSCULAR; INTRAVENOUS
Status: DISCONTINUED | OUTPATIENT
Start: 2023-05-22 | End: 2023-05-22 | Stop reason: HOSPADM

## 2023-05-22 RX ORDER — DIPHENHYDRAMINE HYDROCHLORIDE 50 MG/ML
12.5 INJECTION INTRAMUSCULAR; INTRAVENOUS
Status: DISCONTINUED | OUTPATIENT
Start: 2023-05-22 | End: 2023-05-22 | Stop reason: HOSPADM

## 2023-05-22 RX ORDER — ONDANSETRON 2 MG/ML
INJECTION INTRAMUSCULAR; INTRAVENOUS PRN
Status: DISCONTINUED | OUTPATIENT
Start: 2023-05-22 | End: 2023-05-22 | Stop reason: SDUPTHER

## 2023-05-22 RX ORDER — AMITRIPTYLINE HYDROCHLORIDE 25 MG/1
25 TABLET, FILM COATED ORAL NIGHTLY
Qty: 30 TABLET | Refills: 5 | Status: SHIPPED | OUTPATIENT
Start: 2023-05-22

## 2023-05-22 RX ADMIN — FENTANYL CITRATE 25 MCG: 50 INJECTION, SOLUTION INTRAMUSCULAR; INTRAVENOUS at 07:56

## 2023-05-22 RX ADMIN — FENTANYL CITRATE 25 MCG: 50 INJECTION, SOLUTION INTRAMUSCULAR; INTRAVENOUS at 07:58

## 2023-05-22 RX ADMIN — FENTANYL CITRATE 25 MCG: 50 INJECTION, SOLUTION INTRAMUSCULAR; INTRAVENOUS at 07:42

## 2023-05-22 RX ADMIN — CEFAZOLIN SODIUM 2000 MG: 1 POWDER, FOR SOLUTION INTRAMUSCULAR; INTRAVENOUS at 07:49

## 2023-05-22 RX ADMIN — DEXAMETHASONE SODIUM PHOSPHATE 8 MG: 4 INJECTION, SOLUTION INTRAMUSCULAR; INTRAVENOUS at 07:49

## 2023-05-22 RX ADMIN — PROPOFOL 200 MG: 10 INJECTION, EMULSION INTRAVENOUS at 07:41

## 2023-05-22 RX ADMIN — KETOROLAC TROMETHAMINE 30 MG: 30 INJECTION, SOLUTION INTRAMUSCULAR; INTRAVENOUS at 08:20

## 2023-05-22 RX ADMIN — FENTANYL CITRATE 25 MCG: 50 INJECTION, SOLUTION INTRAMUSCULAR; INTRAVENOUS at 07:53

## 2023-05-22 RX ADMIN — SODIUM CHLORIDE, POTASSIUM CHLORIDE, SODIUM LACTATE AND CALCIUM CHLORIDE: 600; 310; 30; 20 INJECTION, SOLUTION INTRAVENOUS at 06:49

## 2023-05-22 RX ADMIN — MIDAZOLAM HYDROCHLORIDE 2 MG: 1 INJECTION, SOLUTION INTRAMUSCULAR; INTRAVENOUS at 07:34

## 2023-05-22 RX ADMIN — ONDANSETRON HYDROCHLORIDE 4 MG: 2 SOLUTION INTRAMUSCULAR; INTRAVENOUS at 07:49

## 2023-05-22 ASSESSMENT — PAIN - FUNCTIONAL ASSESSMENT: PAIN_FUNCTIONAL_ASSESSMENT: NONE - DENIES PAIN

## 2023-05-22 NOTE — ANESTHESIA PRE PROCEDURE
Department of Anesthesiology  Preprocedure Note       Name:  Liyah Segal   Age:  74 y.o.  :  1950                                          MRN:  [id number]         Date:  2023      Surgeon: Alix Araujo):  Manny Thornton MD    Procedure: Procedure(s):  CYSTOSCOPY WITH HYDRODISTENTION    Medications prior to admission:   Prior to Admission medications    Medication Sig Start Date End Date Taking? Authorizing Provider   brompheniramine-pseudoephedrine 1-15 MG/5ML ELIX elixir Take 5 mLs by mouth every 6 hours as needed    Historical Provider, MD   Bismuth Subsalicylate (PEPTO-BISMOL PO) Take by mouth as needed    Historical Provider, MD   acetaminophen (TYLENOL) 325 MG tablet Take 1 tablet by mouth every 6 hours as needed for Pain    Historical Provider, MD   cefdinir (OMNICEF) 300 MG capsule Take 1 capsule by mouth 2 times daily    Historical Provider, MD   NONFORMULARY Take 50 mg by mouth daily Black Elderberry    Historical Provider, MD   Biotin 5 MG CAPS Take 1 capsule by mouth every morning    Historical Provider, MD   NONFORMULARY Take 6,000 mg by mouth every morning Hair, skin, nails supplement    Historical Provider, MD   Polyvinyl Alcohol-Povidone PF (REFRESH) 1.4-0.6 % SOLN ophthalmic solution Place 1 drop into both eyes as needed    Historical Provider, MD   albuterol sulfate HFA (PROVENTIL;VENTOLIN;PROAIR) 108 (90 Base) MCG/ACT inhaler 2 puffs every 4 hours as needed for Wheezing 22   Ar Automatic Reconciliation   aspirin 325 MG tablet Take 1 tablet by mouth every morning    Ar Automatic Reconciliation   carvedilol (COREG) 6.25 MG tablet Take 1 tablet by mouth 2 times daily 3/15/22   Ar Automatic Reconciliation   vitamin D 25 MCG (1000 UT) CAPS Take 2 capsules by mouth every morning    Ar Automatic Reconciliation   diazePAM (VALIUM) 2 MG tablet Take 0.5 tablets by mouth 3 times daily as needed.     Ar Automatic Reconciliation   dilTIAZem (CARDIZEM) 60 MG tablet Take 1 tablet by mouth 2 times

## 2023-05-22 NOTE — OP NOTE
[address] [facility] [address]  OPERATIVE REPORT    Name:  Eder Reagan  MR#:  [id number]  :  1950  ACCOUNT #:  [de-identified]  DATE OF SERVICE:  2023    PREOPERATIVE DIAGNOSIS:  Chronic cystitis. POSTOPERATIVE DIAGNOSIS:  Chronic cystitis. PROCEDURE PERFORMED:  Cystoscopy, fulguration of bleeding, instillation of steroids, gentamicin, heparin, sodium bicarb into the bladder. SURGEON:  Dillon Venegas MD    ASSISTANT:  none    ANESTHESIA:  General.    COMPLICATIONS:  None. SPECIMENS REMOVED:  Urine for culture and urine for cytology. IMPLANTS:  None. ESTIMATED BLOOD LOSS: minimal.    INDICATIONS:  The patient is known to us with chronic cystitis and overactive bladder symptoms. She presents to undergo hydrodistention. We discussed installation of bladder cocktail. She has a reaction to lidocaine and does not wish that. We discussed placing steroids, antibiotics, bicarb and heparin in the bladder. She is agreeable to that. She presents for the above. FINDINGS AT THE TIME OF THE PROCEDURE:  Bladder capacity at best 300 mL. 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 She has appearance of chronic cystitis within the bladder as well. DESCRIPTION OF PROCEDURE:  After consent was obtained, the patient was taken to the operating room. After adequate anesthesia was obtained, she was prepped and draped in lithotomy position. The rigid cystoscope was inserted into the bladder. Urethra was unremarkable. Trigone was normal.  Both ureters effluxed clear urine. She had evidence of cystitis cystica throughout the bladder. There was a scar on the posterior wall from the previous biopsy site. I did obtain urine for cytology and FISH. The bladder was then drained and then by using the 70-degree lens I performed hydrodistention. The bladder capacity was at best about 300 mL.   When I drained the bladder, the previous scar site from her biopsy bled as well as other areas on the

## 2023-05-22 NOTE — ANESTHESIA POSTPROCEDURE EVALUATION
Department of Anesthesiology  Postprocedure Note    Patient: April Morris  MRN: [id number]  YOB: 1950  Date of evaluation: 5/22/2023      Procedure Summary     Date: 05/22/23 Room / Location: [facility] MAIN OR F7 / [facility] MAIN OR    Anesthesia Start: 9444 Anesthesia Stop: 6155    Procedure: CYSTOSCOPY WITH HYDRODISTENTION (Perineum) Diagnosis:       Chronic interstitial cystitis      (Chronic interstitial cystitis [N30.10])    Surgeons: Dar Garcia MD Responsible Provider: Merced Foster MD    Anesthesia Type: General ASA Status: 3          Anesthesia Type: General    Martínez Phase I: Martínez Score: 10    Martínez Phase II:        Anesthesia Post Evaluation    Patient location during evaluation: PACU  Patient participation: complete - patient participated  Level of consciousness: awake  Airway patency: patent  Nausea & Vomiting: no vomiting and no nausea  Complications: no  Cardiovascular status: hemodynamically stable  Respiratory status: acceptable  Hydration status: stable

## 2023-05-22 NOTE — DISCHARGE INSTRUCTIONS
Take Benadryl as needed, Start taking Detrol, Start taking Elavil      DISCHARGE SUMMARY from your Nurse      PATIENT INSTRUCTIONS    After general anesthesia or intravenous sedation, for 24 hours or while taking prescription Narcotics:  Limit your activities  Do not drive and operate hazardous machinery  Do not make important personal or business decisions  Do  not drink alcoholic beverages  If you have not urinated within 8 hours after discharge, please contact your surgeon on call. Report the following to your surgeon:  Excessive pain, swelling, redness or odor of or around the surgical area  Temperature over 100.5  Nausea and vomiting lasting longer than 4 hours or if unable to take medications  Any signs of decreased circulation or nerve impairment to extremity: change in color, persistent  numbness, tingling, coldness or increase pain  Any questions      GOOD HELP TO FIGHT AN INFECTION  Here are a few tip to help reduce the chance of getting an infection after surgery:  Wash Your Hands  Good handwashing is the most important thing you and your caregiver can do. Wash before and after caring for any wounds. Dry your hand with a clean towel. Wash with soap and water for at least 20 seconds. A TIP: sing the \"Happy Birthday\" song through one time while washing to help with the timing. Use a hand  in between washings. Shower  When your surgeon says it is OK to take a shower, use a new bar of antibacterial soap (if that is what you use, and keep that bar of soap ONLY for your use), or antibacterial body wash. Use a clean wash cloth or sponge when you bathe. Dry off with a clean towel  after every bath - be careful around any wounds, skin staples, sutures or surgical glue over/on wounds. Do not enter swimming pools, hot tubs, lakes, rivers and/or ocean until wounds are healed and your doctor/surgeon says it is OK. Use Clean Sheets  Sleep on freshly laundered sheets after your surgery.   Keep

## 2023-05-22 NOTE — BRIEF OP NOTE
Brief Postoperative Note      Patient: Melina Fishman  YOB: 1950  MRN: [id number]    Date of Procedure: 5/22/2023    Pre-Op Diagnosis Codes:     * Chronic interstitial cystitis [N30.10]    Post-Op Diagnosis: Same       Procedure(s):  CYSTOSCOPY WITH HYDRODISTENTION, fulguration of bleeding, bladder uinstillation    Surgeon(s):  Marek Zaragoza MD    Assistant:  * No surgical staff found *    Anesthesia: General    Estimated Blood Loss (mL): Minimal    Complications: None    Specimens:   ID Type Source Tests Collected by Time Destination   1 : urine culture Urine Urine, Cystoscopic CULTURE, URINE Marek Zaragoza MD 5/22/2023 9959    A : urine cytology Urine Urine, Cystoscopic CYTOLOGY, NON-GYN Marek Zaragoza MD 5/22/2023 0805        Implants:  * No implants in log *      Drains: * No LDAs found *    Findings: hunners ulcers and glomerulations, bleeding post distension      Electronically signed by Lori Doran MD on 5/22/2023 at 8:30 AM

## 2023-05-22 NOTE — INTERVAL H&P NOTE
Update History & Physical    The patient's History and Physical of May 10, 2023 was reviewed with the patient and I examined the patient. There was no change. The surgical site was confirmed by the patient and me. Plan: The risks, benefits, expected outcome, and alternative to the recommended procedure have been discussed with the patient. Patient understands and wants to proceed with the procedure.      Electronically signed by Alla Khan MD on 5/22/2023 at 7:28 AM

## 2023-05-23 LAB
BACTERIA SPEC CULT: NORMAL
SERVICE CMNT-IMP: NORMAL

## 2023-07-20 ENCOUNTER — OFFICE VISIT (OUTPATIENT)
Age: 73
End: 2023-07-20
Payer: MEDICARE

## 2023-07-20 VITALS
OXYGEN SATURATION: 97 % | RESPIRATION RATE: 18 BRPM | HEART RATE: 61 BPM | SYSTOLIC BLOOD PRESSURE: 120 MMHG | HEIGHT: 63 IN | WEIGHT: 146 LBS | BODY MASS INDEX: 25.87 KG/M2 | DIASTOLIC BLOOD PRESSURE: 70 MMHG

## 2023-07-20 DIAGNOSIS — I25.10 ATHEROSCLEROSIS OF NATIVE CORONARY ARTERY OF NATIVE HEART WITHOUT ANGINA PECTORIS: Primary | ICD-10-CM

## 2023-07-20 DIAGNOSIS — I10 ESSENTIAL (PRIMARY) HYPERTENSION: ICD-10-CM

## 2023-07-20 DIAGNOSIS — E78.2 MIXED HYPERLIPIDEMIA: ICD-10-CM

## 2023-07-20 PROCEDURE — G8427 DOCREV CUR MEDS BY ELIG CLIN: HCPCS | Performed by: INTERNAL MEDICINE

## 2023-07-20 PROCEDURE — G8400 PT W/DXA NO RESULTS DOC: HCPCS | Performed by: INTERNAL MEDICINE

## 2023-07-20 PROCEDURE — 1036F TOBACCO NON-USER: CPT | Performed by: INTERNAL MEDICINE

## 2023-07-20 PROCEDURE — 3078F DIAST BP <80 MM HG: CPT | Performed by: INTERNAL MEDICINE

## 2023-07-20 PROCEDURE — 3074F SYST BP LT 130 MM HG: CPT | Performed by: INTERNAL MEDICINE

## 2023-07-20 PROCEDURE — 99214 OFFICE O/P EST MOD 30 MIN: CPT | Performed by: INTERNAL MEDICINE

## 2023-07-20 PROCEDURE — G8419 CALC BMI OUT NRM PARAM NOF/U: HCPCS | Performed by: INTERNAL MEDICINE

## 2023-07-20 PROCEDURE — 3017F COLORECTAL CA SCREEN DOC REV: CPT | Performed by: INTERNAL MEDICINE

## 2023-07-20 PROCEDURE — 1090F PRES/ABSN URINE INCON ASSESS: CPT | Performed by: INTERNAL MEDICINE

## 2023-07-20 PROCEDURE — 1123F ACP DISCUSS/DSCN MKR DOCD: CPT | Performed by: INTERNAL MEDICINE

## 2023-07-20 NOTE — PROGRESS NOTES
Chief Complaint   Patient presents with    Chest Pain         Chest Pain: no pain just feel pressure     Palpitations: none     SOB: always nothing new , pt has asthma     Dizziness: yes, pt has vertigo     Swelling: no    Last Lipids  No results found for: CHOL  No results found for: TRIG  No results found for: HDL  No results found for: LDLCHOLESTEROL, LDLCALC  No results found for: LABVLDL, VLDL  No results found for: CHOLHDLRATIO      Refills    There were no vitals taken for this visit. Have you been to the ER, urgent care clinic since your last visit? no  Hospitalized since your last visit? NO    2. Have you seen or consulted with any other health care providers outside of the [address] [place] [address] Avenue since your last visit?   Yes, pt seen her PCP  due to chest pain

## 2023-07-20 NOTE — PROGRESS NOTES
Iesha Yee MD., [facility]      [address] [facility] Roh[facility] [place]ILLIPS      Office [phone],[facility] [phone]                 Iesha Yee is a 74 y.o.  female - f/u visit   CC - as documented in EMR      Dear Dr. Walter Cedeno, DO      I had the pleasure of seeing Ms. Iesha Yee in the office today. Assessment:        CAD -nonobstructive disease by cardiac cath 6/2021    Hypertension   Hyperlipidemia   History of CVA   Dizziness-. She has been seen by ENT physician and has been referred to neurology. Diagnosed to have vertigo. She has considered surgery but does not want to proceed for now. Anxiety          Plan:      Echocardiogram 3/2021 - Nml EF    LHC 6/22/21 - no sig CAD   (Pt does not want to do any kind of stress test because of her prior experience with it.)     Target LDL less than 70. Continue statin. Aggressive cardiovascular risk factor modification. Follow-up in 12months/earlier as needed      Patient understands the plan. All questions were answered to the patient's satisfaction. I appreciate the opportunity to be involved in Ms. Whitley. See note below for details. Please do not hesitate to contact us    with questions or concerns. Iesha Yee MD             Cardiac Testing/ Procedures: A. Cardiac Cath/PCI:  6/22/21   R Radial access - 6 F sheath   RCA - JR4; LCA - JL4       L Main: Very Short; Nml       LAD: Tortuous; Med; Mid 30%; D1/D2- MLI       LCflex: Large; MLI; Small OM1 and OM2; Large OM3 - MLI       RCA: Dominant; Mid 20%; Small PDA; Very small PLB        LVEDP:  8 mm Hg       LVEF: Not assessed       No significant gradient across aortic valve.        PCI: none           Specimens Removed : None       Complications: None       Closure Device: R Radial - TR band         5/23/17-N STEMI-CJ W; left main-normal, LAD-mid 30-40%, left [id number]%, RCA-medium vessel small PDA;

## 2023-08-07 ENCOUNTER — HOSPITAL ENCOUNTER (EMERGENCY)
Facility: HOSPITAL | Age: 73
Discharge: HOME | End: 2023-08-07
Attending: EMERGENCY MEDICINE
Payer: MEDICARE

## 2023-08-07 ENCOUNTER — APPOINTMENT (OUTPATIENT)
Facility: HOSPITAL | Age: 73
End: 2023-08-07
Payer: MEDICARE

## 2023-08-07 VITALS
OXYGEN SATURATION: 97 % | SYSTOLIC BLOOD PRESSURE: 152 MMHG | BODY MASS INDEX: 25.86 KG/M2 | DIASTOLIC BLOOD PRESSURE: 76 MMHG | HEART RATE: 63 BPM | HEIGHT: 63 IN | RESPIRATION RATE: 16 BRPM | TEMPERATURE: 98 F

## 2023-08-07 DIAGNOSIS — M25.572 LEFT ANKLE PAIN, UNSPECIFIED CHRONICITY: ICD-10-CM

## 2023-08-07 DIAGNOSIS — M25.551 RIGHT HIP PAIN: ICD-10-CM

## 2023-08-07 DIAGNOSIS — M25.562 LEFT KNEE PAIN, UNSPECIFIED CHRONICITY: ICD-10-CM
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
## 2023-08-07 PROCEDURE — 73610 X-RAY EXAM OF ANKLE: CPT

## 2023-08-07 PROCEDURE — 73562 X-RAY EXAM OF KNEE 3: CPT

## 2023-08-07 PROCEDURE — 71046 X-RAY EXAM CHEST 2 VIEWS: CPT

## 2023-08-07 PROCEDURE — 99283 EMERGENCY DEPT VISIT LOW MDM: CPT

## 2023-08-07 PROCEDURE — 73502 X-RAY EXAM HIP UNI 2-3 VIEWS: CPT

## 2023-08-07 RX ORDER — KETOROLAC TROMETHAMINE 30 MG/ML
15 INJECTION, SOLUTION INTRAMUSCULAR; INTRAVENOUS ONCE
Status: DISCONTINUED | OUTPATIENT
Start: 2023-08-07 | End: 2023-08-07 | Stop reason: HOSPADM

## 2023-08-07 ASSESSMENT — ENCOUNTER SYMPTOMS
SORE THROAT: 0
CHEST TIGHTNESS: 0
WHEEZING: 0
BACK PAIN: 0
ABDOMINAL PAIN: 0
RHINORRHEA: 0
DIARRHEA: 0
SHORTNESS OF BREATH: 0
VOMITING: 0
NAUSEA: 0
COUGH: 0

## 2023-08-07 ASSESSMENT — LIFESTYLE VARIABLES: HOW OFTEN DO YOU HAVE A DRINK CONTAINING ALCOHOL: PATIENT DECLINED

## 2023-08-07 ASSESSMENT — PAIN DESCRIPTION - LOCATION: LOCATION: KNEE;ANKLE

## 2023-08-07 ASSESSMENT — PAIN DESCRIPTION - ORIENTATION: ORIENTATION: LEFT

## 2023-08-07 ASSESSMENT — PAIN DESCRIPTION - DESCRIPTORS: DESCRIPTORS: THROBBING

## 2023-08-07 NOTE — ED NOTES
Patient did not like the ortho splint applied. Removed and instead applied ace wrap. Patient reports this felt much better.      Jonny Mejía RN  08/07/23 8179

## 2023-08-07 NOTE — ED TRIAGE NOTES
Patient presents to treatment area via wheelchair. Patient states that while she was walking about 2 hours ago, her left knee \"gave out\", causing her to fall in a twisting motion. Patient complains of left knee pain, left ankle pain, right hip and shoulder pain, and mid sternal chest pain since the incident. Denies striking head or LOC.

## 2024-08-02 ENCOUNTER — OFFICE VISIT (OUTPATIENT)
Age: 74
End: 2024-08-02
Payer: MEDICARE

## 2024-08-02 VITALS
OXYGEN SATURATION: 94 % | SYSTOLIC BLOOD PRESSURE: 138 MMHG | DIASTOLIC BLOOD PRESSURE: 88 MMHG | WEIGHT: 145 LBS | HEART RATE: 69 BPM | HEIGHT: 63 IN | BODY MASS INDEX: 25.69 KG/M2

## 2024-08-02 DIAGNOSIS — I25.10 CORONARY ARTERY DISEASE INVOLVING NATIVE CORONARY ARTERY OF NATIVE HEART WITHOUT ANGINA PECTORIS: ICD-10-CM

## 2024-08-02 DIAGNOSIS — E78.2 MIXED HYPERLIPIDEMIA: ICD-10-CM

## 2024-08-02 DIAGNOSIS — I25.10 ATHEROSCLEROSIS OF NATIVE CORONARY ARTERY OF NATIVE HEART WITHOUT ANGINA PECTORIS: Primary | ICD-10-CM

## 2024-08-02 DIAGNOSIS — I10 ESSENTIAL (PRIMARY) HYPERTENSION: ICD-10-CM

## 2024-08-02 PROCEDURE — 1090F PRES/ABSN URINE INCON ASSESS: CPT | Performed by: INTERNAL MEDICINE

## 2024-08-02 PROCEDURE — 1123F ACP DISCUSS/DSCN MKR DOCD: CPT | Performed by: INTERNAL MEDICINE

## 2024-08-02 PROCEDURE — 99214 OFFICE O/P EST MOD 30 MIN: CPT | Performed by: INTERNAL MEDICINE

## 2024-08-02 PROCEDURE — G8400 PT W/DXA NO RESULTS DOC: HCPCS | Performed by: INTERNAL MEDICINE

## 2024-08-02 PROCEDURE — G8419 CALC BMI OUT NRM PARAM NOF/U: HCPCS | Performed by: INTERNAL MEDICINE

## 2024-08-02 PROCEDURE — 3079F DIAST BP 80-89 MM HG: CPT | Performed by: INTERNAL MEDICINE

## 2024-08-02 PROCEDURE — 93010 ELECTROCARDIOGRAM REPORT: CPT | Performed by: INTERNAL MEDICINE

## 2024-08-02 PROCEDURE — 93005 ELECTROCARDIOGRAM TRACING: CPT | Performed by: INTERNAL MEDICINE

## 2024-08-02 PROCEDURE — 1036F TOBACCO NON-USER: CPT | Performed by: INTERNAL MEDICINE

## 2024-08-02 PROCEDURE — G8427 DOCREV CUR MEDS BY ELIG CLIN: HCPCS | Performed by: INTERNAL MEDICINE

## 2024-08-02 PROCEDURE — 3075F SYST BP GE 130 - 139MM HG: CPT | Performed by: INTERNAL MEDICINE

## 2024-08-02 NOTE — PROGRESS NOTES
Chief Complaint   Patient presents with    Coronary Artery Disease    Hypertension    Cholesterol Problem     Vitals:    08/02/24 1451   BP: 138/88   Site: Left Upper Arm   Position: Sitting   Cuff Size: Medium Adult   Pulse: 69   SpO2: 94%   Weight: 65.8 kg (145 lb)   Height: 1.6 m (5' 3\")      /88 (Site: Left Upper Arm, Position: Sitting, Cuff Size: Medium Adult)   Pulse 69   Ht 1.6 m (5' 3\")   Wt 65.8 kg (145 lb)   SpO2 94%   BMI 25.69 kg/m²

## 2024-08-02 NOTE — PROGRESS NOTES
Pool Quinonez MD., [facility]      [address],[facility],[place] [address]      Office [phone],Fax [phone]                 Tamela Chase is a 72 y.o.  female - f/u visit   CC - as documented in EMR      Dear Dr. Shankar, Renetta Stokes, DO      I had the pleasure of seeing Ms.Tamela Chase in the office today.             Assessment:        CAD -nonobstructive disease by cardiac cath 6/2021   Dyspnea  COPD   Hypertension   Hyperlipidemia   History of CVA   Dizziness-.  She has been seen by ENT physician and has been referred to neurology.  Diagnosed to have vertigo.  She has considered surgery but does not want to proceed for now.   Anxiety          Plan:       ECHO    [facility] 6/22/21 - no sig CAD   (Pt does not want to do any kind of stress test because of her prior experience with it.)     Target LDL less than 70.  Continue statin. 5/2023 - LDL 53   Aggressive cardiovascular risk factor modification.   Follow-up in 12months/earlier as needed      Patient understands the plan. All questions were answered to the patient's satisfaction.      I appreciate the opportunity to be involved in . See note below for details. Please do not hesitate to contact us    with questions or concerns.      Pool Quinonez MD             Cardiac Testing/ Procedures:           A.Cardiac Cath/PCI:  6/22/21   R Radial access - 6 F sheath   RCA - JR4; LCA - JL4       L Main: Very Short; Nml       LAD: Tortuous; Med; Mid 30%; D1/D2- MLI       LCflex: Large; MLI; Small OM1 and OM2; Large OM3 - MLI       RCA: Dominant; Mid 20%; Small PDA; Very small PLB        LVEDP:  8 mm Hg       LVEF: Not assessed       No significant gradient across aortic valve.       PCI: none           Specimens Removed : None       Complications: None       Closure Device: R Radial - TR band         5/23/17-N STEMI-CJ W; left main-normal, LAD-mid 30-40%, left [id number]%, RCA-medium vessel small

## 2024-11-27 ENCOUNTER — ANCILLARY PROCEDURE (OUTPATIENT)
Age: 74
End: 2024-11-27
Payer: MEDICARE

## 2024-11-27 ENCOUNTER — TELEPHONE (OUTPATIENT)
Age: 74
End: 2024-11-27

## 2024-11-27 VITALS
HEART RATE: 60 BPM | WEIGHT: 145 LBS | SYSTOLIC BLOOD PRESSURE: 142 MMHG | BODY MASS INDEX: 25.69 KG/M2 | HEIGHT: 63 IN | DIASTOLIC BLOOD PRESSURE: 70 MMHG

## 2024-11-27 DIAGNOSIS — E78.2 MIXED HYPERLIPIDEMIA: ICD-10-CM

## 2024-11-27 DIAGNOSIS — I25.10 ATHEROSCLEROSIS OF NATIVE CORONARY ARTERY OF NATIVE HEART WITHOUT ANGINA PECTORIS: ICD-10-CM

## 2024-11-27 DIAGNOSIS — I25.10 CORONARY ARTERY DISEASE INVOLVING NATIVE CORONARY ARTERY OF NATIVE HEART WITHOUT ANGINA PECTORIS: ICD-10-CM

## 2024-11-27 DIAGNOSIS — I10 ESSENTIAL (PRIMARY) HYPERTENSION: ICD-10-CM

## 2024-11-27 PROCEDURE — 93306 TTE W/DOPPLER COMPLETE: CPT | Performed by: INTERNAL MEDICINE

## 2024-11-27 NOTE — TELEPHONE ENCOUNTER
Returned call.  Left VM with provider instructions to keep a home BP log and to see NP in office to review and make any medication changes.  Patient was also in office today for echo.

## 2024-12-02 LAB
ECHO AO ASC DIAM: 3.3 CM
ECHO AO ASCENDING AORTA INDEX: 1.95 CM/M2
ECHO AO ROOT DIAM: 3.1 CM
ECHO AO ROOT INDEX: 1.83 CM/M2
ECHO AV AREA PEAK VELOCITY: 2.3 CM2
ECHO AV AREA VTI: 2.3 CM2
ECHO AV AREA/BSA PEAK VELOCITY: 1.4 CM2/M2
ECHO AV AREA/BSA VTI: 1.4 CM2/M2
ECHO AV MEAN GRADIENT: 4 MMHG
ECHO AV MEAN VELOCITY: 0.9 M/S
ECHO AV PEAK GRADIENT: 8 MMHG
ECHO AV PEAK VELOCITY: 1.4 M/S
ECHO AV VELOCITY RATIO: 0.71
ECHO AV VTI: 31.3 CM
ECHO BSA: 1.71 M2
ECHO LA DIAMETER INDEX: 2.25 CM/M2
ECHO LA DIAMETER: 3.8 CM
ECHO LA TO AORTIC ROOT RATIO: 1.23
ECHO LA VOL A-L A2C: 54 ML (ref 22–52)
ECHO LA VOL A-L A4C: 48 ML (ref 22–52)
ECHO LA VOL MOD A2C: 54 ML (ref 22–52)
ECHO LA VOL MOD A4C: 48 ML (ref 22–52)
ECHO LA VOLUME AREA LENGTH: 53 ML
ECHO LA VOLUME INDEX A-L A2C: 32 ML/M2 (ref 16–34)
ECHO LA VOLUME INDEX A-L A4C: 28 ML/M2 (ref 16–34)
ECHO LA VOLUME INDEX AREA LENGTH: 31 ML/M2 (ref 16–34)
ECHO LA VOLUME INDEX MOD A2C: 32 ML/M2 (ref 16–34)
ECHO LA VOLUME INDEX MOD A4C: 28 ML/M2 (ref 16–34)
ECHO LV E' LATERAL VELOCITY: 5.64 CM/S
ECHO LV E' SEPTAL VELOCITY: 4.73 CM/S
ECHO LV EDV A2C: 93 ML
ECHO LV EDV A4C: 96 ML
ECHO LV EDV BP: 95 ML (ref 56–104)
ECHO LV EDV INDEX A4C: 57 ML/M2
ECHO LV EDV INDEX BP: 56 ML/M2
ECHO LV EDV NDEX A2C: 55 ML/M2
ECHO LV EJECTION FRACTION A2C: 58 %
ECHO LV EJECTION FRACTION A4C: 61 %
ECHO LV EJECTION FRACTION BIPLANE: 60 % (ref 55–100)
ECHO LV ESV A2C: 39 ML
ECHO LV ESV A4C: 37 ML
ECHO LV ESV BP: 38 ML (ref 19–49)
ECHO LV ESV INDEX A2C: 23 ML/M2
ECHO LV ESV INDEX A4C: 22 ML/M2
ECHO LV ESV INDEX BP: 22 ML/M2
ECHO LV FRACTIONAL SHORTENING: 36 % (ref 28–44)
ECHO LV INTERNAL DIMENSION DIASTOLE INDEX: 2.31 CM/M2
ECHO LV INTERNAL DIMENSION DIASTOLIC: 3.9 CM (ref 3.9–5.3)
ECHO LV INTERNAL DIMENSION SYSTOLIC INDEX: 1.48 CM/M2
ECHO LV INTERNAL DIMENSION SYSTOLIC: 2.5 CM
ECHO LV IVSD: 1 CM (ref 0.6–0.9)
ECHO LV MASS 2D: 113.6 G (ref 67–162)
ECHO LV MASS INDEX 2D: 67.2 G/M2 (ref 43–95)
ECHO LV POSTERIOR WALL DIASTOLIC: 0.9 CM (ref 0.6–0.9)
ECHO LV RELATIVE WALL THICKNESS RATIO: 0.46
ECHO LVOT AREA: 3.5 CM2
ECHO LVOT AV VTI INDEX: 0.65
ECHO LVOT DIAM: 2.1 CM
ECHO LVOT MEAN GRADIENT: 2 MMHG
ECHO LVOT PEAK GRADIENT: 4 MMHG
ECHO LVOT PEAK VELOCITY: 1 M/S
ECHO LVOT STROKE VOLUME INDEX: 41.8 ML/M2
ECHO LVOT SV: 70.6 ML
ECHO LVOT VTI: 20.4 CM
ECHO MV A VELOCITY: 0.95 M/S
ECHO MV AREA PHT: 3.3 CM2
ECHO MV AREA VTI: 3.1 CM2
ECHO MV E DECELERATION TIME (DT): 229.6 MS
ECHO MV E VELOCITY: 0.55 M/S
ECHO MV E/A RATIO: 0.58
ECHO MV E/E' LATERAL: 9.75
ECHO MV E/E' RATIO (AVERAGED): 10.69
ECHO MV E/E' SEPTAL: 11.63
ECHO MV LVOT VTI INDEX: 1.12
ECHO MV MAX VELOCITY: 1 M/S
ECHO MV MEAN GRADIENT: 1 MMHG
ECHO MV MEAN VELOCITY: 0.5 M/S
ECHO MV PEAK GRADIENT: 4 MMHG
ECHO MV PRESSURE HALF TIME (PHT): 66.6 MS
ECHO MV VTI: 22.9 CM
ECHO RA AREA 4C: 17 CM2
ECHO RA END SYSTOLIC VOLUME APICAL 4 CHAMBER INDEX BSA: 28 ML/M2
ECHO RA VOLUME: 48 ML
ECHO RV INTERNAL DIMENSION: 3.8 CM
ECHO RV TAPSE: 2.4 CM (ref 1.7–?)
ECHO TV REGURGITANT MAX VELOCITY: 2.33 M/S
ECHO TV REGURGITANT PEAK GRADIENT: 22 MMHG

## 2024-12-02 PROCEDURE — 93306 TTE W/DOPPLER COMPLETE: CPT | Performed by: INTERNAL MEDICINE

## 2024-12-03 ENCOUNTER — TELEPHONE (OUTPATIENT)
Age: 74
End: 2024-12-03

## 2024-12-03 NOTE — TELEPHONE ENCOUNTER
----- Message from Dr. Pool Quinonez MD sent at 12/2/2024  5:32 PM EST -----  Normal EF on echo.  Mild abnormalities with increased stiffness of heart muscle but with normal pumping function.

## 2024-12-03 NOTE — TELEPHONE ENCOUNTER
Patient returned nurse call.    Faxed Echo to PCP    # [phone]  
Keep patient updated on care plan

## 2024-12-03 NOTE — TELEPHONE ENCOUNTER
Reviewed testing results with patient.  Patient expressed concerned with continuing current medications due to side effects of dizziness and fatigued.  Encouraged patient to keep a BP log (as discussed previously) for review to best help Dr Quinonez make a recommendation for her blood pressure medication.  States he daughter is on losartan and it works well, would like to try that.  She states she will keep a log for about a week and ask her daughter to drop it off at the office as she works here.

## 2025-02-26 ENCOUNTER — HOSPITAL ENCOUNTER (EMERGENCY)
Facility: HOSPITAL | Age: 75
Discharge: HOME | End: 2025-02-26
Attending: EMERGENCY MEDICINE
Payer: MEDICARE

## 2025-02-26 ENCOUNTER — APPOINTMENT (OUTPATIENT)
Facility: HOSPITAL | Age: 75
End: 2025-02-26
Payer: MEDICARE

## 2025-02-26 VITALS
RESPIRATION RATE: 18 BRPM | SYSTOLIC BLOOD PRESSURE: 150 MMHG | WEIGHT: 154 LBS | TEMPERATURE: 98 F | BODY MASS INDEX: 25.66 KG/M2 | DIASTOLIC BLOOD PRESSURE: 65 MMHG | HEART RATE: 66 BPM | OXYGEN SATURATION: 96 % | HEIGHT: 65 IN

## 2025-02-26 DIAGNOSIS — S70.12XA CONTUSION OF LEFT HIP AND THIGH, INITIAL ENCOUNTER: Primary | ICD-10-CM

## 2025-02-26 DIAGNOSIS — S80.02XA CONTUSION OF LEFT KNEE, INITIAL ENCOUNTER: ICD-10-CM

## 2025-02-26 DIAGNOSIS — S39.012A LOW BACK STRAIN, INITIAL ENCOUNTER: ICD-10-CM
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
## 2025-02-26 DIAGNOSIS — S70.02XA CONTUSION OF LEFT HIP AND THIGH, INITIAL ENCOUNTER: Primary | ICD-10-CM

## 2025-02-26 PROCEDURE — 99283 EMERGENCY DEPT VISIT LOW MDM: CPT

## 2025-02-26 PROCEDURE — 73562 X-RAY EXAM OF KNEE 3: CPT

## 2025-02-26 PROCEDURE — 72100 X-RAY EXAM L-S SPINE 2/3 VWS: CPT

## 2025-02-26 PROCEDURE — 73502 X-RAY EXAM HIP UNI 2-3 VIEWS: CPT

## 2025-02-26 RX ORDER — ACETAMINOPHEN 500 MG
1000 TABLET ORAL EVERY 6 HOURS PRN
Qty: 40 TABLET | Refills: 0 | Status: SHIPPED | OUTPATIENT
Start: 2025-02-26

## 2025-02-26 ASSESSMENT — PAIN DESCRIPTION - DESCRIPTORS: DESCRIPTORS: ACHING

## 2025-02-26 ASSESSMENT — PAIN DESCRIPTION - ORIENTATION: ORIENTATION: LEFT

## 2025-02-26 ASSESSMENT — PAIN SCALES - GENERAL: PAINLEVEL_OUTOF10: 6

## 2025-02-26 ASSESSMENT — PAIN DESCRIPTION - LOCATION: LOCATION: KNEE;HIP;BACK

## 2025-02-26 ASSESSMENT — PAIN - FUNCTIONAL ASSESSMENT: PAIN_FUNCTIONAL_ASSESSMENT: 0-10

## 2025-02-26 NOTE — ED TRIAGE NOTES
GCS 15. Patient wheeled to treatment area. Patient states that she was walking up the stairs on her back deck this AM when she tripped and fell.   Patient states that she rolled onto her left knee and hip.   Patient c/o left knee, hip, and left sided lower back pain.  Patient concerned since she had her left knee replaced 10 years ago.

## 2025-02-27 NOTE — ED PROVIDER NOTES
Electronically signed by Collins Sales           LABS:  Labs Reviewed - No data to display    CONSULTS:  None    PROCEDURES:     Procedures      FINAL IMPRESSION      1. Contusion of left hip and thigh, initial encounter    2. Contusion of left knee, initial encounter    3. Low back strain, initial encounter    4. Fall from standing, initial encounter          DISPOSITION/PLAN   DISPOSITION Decision To Discharge 02/26/2025 02:07:23 PM      PATIENT REFERRED TO:  Renetta Shankar DO  [address] [facility] [id number] [phone]    Schedule an appointment as soon as possible for a visit   As needed, for re-evaluation      DISCHARGE MEDICATIONS:  Discharge Medication List as of 2/26/2025  2:09 PM            (Please note that portions of this note were completed with a transcription program.  Efforts were made to edit the dictations but occasionally words are mis-transcribed.)    Dharmesh Howe MD (electronically signed)  Emergency Attending Physician          Dharmesh Howe MD  02/26/25 2055

## 2025-06-17 ENCOUNTER — CLINICAL DOCUMENTATION (OUTPATIENT)
Age: 75
End: 2025-06-17

## 2025-06-24 ENCOUNTER — TRANSCRIBE ORDERS (OUTPATIENT)
Facility: HOSPITAL | Age: 75
End: 2025-06-24

## 2025-06-24 DIAGNOSIS — M17.12 PRIMARY LOCALIZED OSTEOARTHRITIS OF LEFT KNEE: Primary | ICD-10-CM

## 2025-06-24 DIAGNOSIS — M79.89 MASS OF SOFT TISSUE OF THIGH: ICD-10-CM

## 2025-07-31 ENCOUNTER — HOSPITAL ENCOUNTER (OUTPATIENT)
Facility: HOSPITAL | Age: 75
Discharge: HOME | End: 2025-07-31
Payer: MEDICARE

## 2025-07-31 ENCOUNTER — HOSPITAL ENCOUNTER (OUTPATIENT)
Facility: HOSPITAL | Age: 75
End: 2025-07-31
Payer: MEDICARE

## 2025-07-31 DIAGNOSIS — M17.12 PRIMARY LOCALIZED OSTEOARTHRITIS OF LEFT KNEE: ICD-10-CM

## 2025-07-31 DIAGNOSIS — M79.89 MASS OF SOFT TISSUE OF THIGH: ICD-10-CM

## 2025-07-31 PROCEDURE — 73718 MRI LOWER EXTREMITY W/O DYE: CPT

## 2025-07-31 PROCEDURE — 73721 MRI JNT OF LWR EXTRE W/O DYE: CPT

## (undated) DEVICE — BLUNTFILL WITH FILTER: Brand: MONOJECT

## (undated) DEVICE — BLUNTFILL: Brand: MONOJECT

## (undated) DEVICE — SOLIDIFIER MEDC 1200ML -- CONVERT TO 356117

## (undated) DEVICE — PACK PROCEDURE SURG HRT CATH

## (undated) DEVICE — TUBING PRSS MON L12IN PVC RIG NONEXPANDING M TO FEM CONN

## (undated) DEVICE — BASIN EMSIS 16OZ GRAPHITE PLAS KID SHP MOLD GRAD FOR ORAL

## (undated) DEVICE — CATH IV AUTOGRD BC BLU 22GA 25 -- INSYTE

## (undated) DEVICE — CATH DIAG D 5F 155 MULTIPK X3 -- IMPULSE 16391-302

## (undated) DEVICE — GLIDESHEATH SLENDER ACCESS KIT: Brand: GLIDESHEATH SLENDER

## (undated) DEVICE — SYR 3ML LL TIP 1/10ML GRAD --

## (undated) DEVICE — GLOVE ORANGE PI 8   MSG9080

## (undated) DEVICE — BITEBLOCK ENDOSCP 60FR MAXI WHT POLYETH STURDY W/ VELC WVN

## (undated) DEVICE — SYR MED COLOR CODED 3ML RED -- MEDALLION

## (undated) DEVICE — SYRINGE MED 10ML RED POLYCARB BRL FIX M LUER CONN FLAT GRP

## (undated) DEVICE — SYRINGE MED 50ML LUERLOCK TIP

## (undated) DEVICE — 4-PORT MANIFOLD: Brand: NEPTUNE 2

## (undated) DEVICE — SOLUTION IRRIGATION H2O 0797305] ICU MEDICAL INC]

## (undated) DEVICE — ROSEN CURVED WIRE GUIDE: Brand: ROSEN

## (undated) DEVICE — CONTAINER,SPECIMEN,3OZ,OR STRL: Brand: MEDLINE

## (undated) DEVICE — SOLUTION IRRIG 1000ML STRL H2O USP PLAS POUR BTL

## (undated) DEVICE — BAG SPEC BIOHZRD 10 X 10 IN --

## (undated) DEVICE — SUPPORT WRST AD W3.5XL9IN DIA14.5IN ART SFT ADJ HK AND LOOP

## (undated) DEVICE — 1200 GUARD II KIT W/5MM TUBE W/O VAC TUBE: Brand: GUARDIAN

## (undated) DEVICE — CONTAINER SPEC 20 ML LID NEUT BUFF FORMALIN 10 % POLYPR STS

## (undated) DEVICE — Device

## (undated) DEVICE — PROCEDURE KIT FLUID MGMT CUST MAINFOLD STRL

## (undated) DEVICE — TR BAND RADIAL ARTERY COMPRESSION DEVICE: Brand: TR BAND

## (undated) DEVICE — CORD LAPAROSCOPIC MONOPOLAR

## (undated) DEVICE — BAG BELONG PT PERS CLEAR HANDL

## (undated) DEVICE — TUBING, SUCTION, 1/4" X 12', STRAIGHT: Brand: MEDLINE

## (undated) DEVICE — SYR 5ML 1/5 GRAD LL NSAF LF --

## (undated) DEVICE — KIT COLON W/ 1.1OZ LUB AND 2 END

## (undated) DEVICE — FORCEPS BX L240CM JAW DIA2.8MM L CAP W/ NDL MIC MESH TOOTH

## (undated) DEVICE — SET ADMIN 16ML TBNG L100IN 2 Y INJ SITE IV PIGGY BK DISP

## (undated) DEVICE — CYSTO-SFMC: Brand: MEDLINE INDUSTRIES, INC.

## (undated) DEVICE — SOLUTION SCRB 10% POVIDONE IOD L8IN WNG SAT SPNG STK

## (undated) DEVICE — CANNULA CUSH AD W/ 14FT TBG

## (undated) DEVICE — ELECTRODE,RADIOTRANSLUCENT,FOAM,3PK: Brand: MEDLINE

## (undated) DEVICE — SOLIDIFIER FLD 2OZ 1500CC N DISINF IN BTL DISP SAFESORB